# Patient Record
Sex: MALE | Race: WHITE | NOT HISPANIC OR LATINO | Employment: FULL TIME | URBAN - METROPOLITAN AREA
[De-identification: names, ages, dates, MRNs, and addresses within clinical notes are randomized per-mention and may not be internally consistent; named-entity substitution may affect disease eponyms.]

---

## 2017-02-14 ENCOUNTER — TELEPHONE (OUTPATIENT)
Dept: FAMILY MEDICINE | Facility: OTHER | Age: 37
End: 2017-02-14

## 2017-02-14 DIAGNOSIS — R06.02 SOB (SHORTNESS OF BREATH): Primary | ICD-10-CM

## 2017-02-14 NOTE — TELEPHONE ENCOUNTER
CALL BACK and he is still having difficulty breathing mostly on exertion so would like to follow up with a specialist    Please put in referral

## 2017-02-14 NOTE — TELEPHONE ENCOUNTER
The report was never finalized, which is why I didn't receive it in my inbox for review (may want to let Radiology know this).  CT is negative, no abnormal findings present.  I apologize for the delay.

## 2017-02-21 ENCOUNTER — OFFICE VISIT (OUTPATIENT)
Dept: SLEEP MEDICINE | Facility: HOSPITAL | Age: 37
End: 2017-02-21
Attending: FAMILY MEDICINE

## 2017-02-21 VITALS
HEIGHT: 67 IN | SYSTOLIC BLOOD PRESSURE: 128 MMHG | HEART RATE: 58 BPM | BODY MASS INDEX: 39.55 KG/M2 | DIASTOLIC BLOOD PRESSURE: 88 MMHG | WEIGHT: 252 LBS | OXYGEN SATURATION: 96 %

## 2017-02-21 DIAGNOSIS — R06.02 SOB (SHORTNESS OF BREATH): ICD-10-CM

## 2017-02-21 PROCEDURE — 99212 OFFICE O/P EST SF 10 MIN: CPT

## 2017-02-21 PROCEDURE — 99212 OFFICE O/P EST SF 10 MIN: CPT | Performed by: INTERNAL MEDICINE

## 2017-02-21 PROCEDURE — 99213 OFFICE O/P EST LOW 20 MIN: CPT

## 2017-02-21 NOTE — NURSING NOTE
Patient ID was checked. Allergies and home medications were reviewed and a brief history was obtained.

## 2017-02-21 NOTE — MR AVS SNAPSHOT
After Visit Summary   2/21/2017    Kale Rosario    MRN: [id number]           Patient Information     Date Of Birth          1980        Visit Information        Provider Department      2/21/2017 9:00 AM Sylvain Klein MD [facility]        Today's Diagnoses     SOB (shortness of breath)           Follow-ups after your visit        Your next 10 appointments already scheduled     Mar 02, 2017  4:15 PM CST   Pulmonary Function with [facility]   [facility] ([facility] )    [address] [place] [id number] [phone]           No Inhalers for 6 hours prior to test No Smoking 2 hours prior to test            May 08, 2017  9:40 AM CDT   (Arrive by 9:25 AM)   CT LUMBAR SPINE W/O CONTRAST with UCCT1   [facility] [place] [facility] ([facility])    [address] [place] [address] [phone]           Please bring any scans or X-rays taken at other hospitals, if similar tests were done. Also bring a list of your medicines, including vitamins, minerals and over-the-counter drugs. It is safest to leave personal items at home.  Be sure to tell your doctor:   If you have any allergies.   If there s any chance you are pregnant.   If you are breastfeeding.   If you have any special needs.  You do not need to do anything special to prepare.  Please wear loose clothing, such as a sweat suit or jogging clothes. Avoid snaps, zippers and other metal. We may ask you to undress and put on a hospital gown.            May 15, 2017 11:15 AM CDT   (Arrive by 11:00 AM)   Return Visit with Devon Summers MD   [facility] Neurosurgery ([facility])    [address] [place] [address] [phone]              Who to contact     If you have questions or need follow up information about today's clinic visit or your schedule please contact [facility] directly at  "[phone].  Normal or non-critical lab and imaging results will be communicated to you by Reevoohart, letter or phone within 4 business days after the clinic has received the results. If you do not hear from us within 7 days, please contact the clinic through Reevoohart or phone. If you have a critical or abnormal lab result, we will notify you by phone as soon as possible.  Submit refill requests through [employer] or call your pharmacy and they will forward the refill request to us. Please allow 3 business days for your refill to be completed.          Additional Information About Your Visit        Reevoohart Information     Aseptia lets you send messages to your doctor, view your test results, renew your prescriptions, schedule appointments and more. To sign up, go to [email or web address] . Click on \"Log in\" on the left side of the screen, which will take you to the Welcome page. Then click on \"Sign up Now\" on the right side of the page.     You will be asked to enter the access code listed below, as well as some personal information. Please follow the directions to create your username and password.     Your access code is: [email or web address]  Expires: 2017  9:47 AM     Your access code will  in 90 days. If you need help or a new code, please call your [place] clinic or [phone].        Care EveryWhere ID     This is your Care EveryWhere ID. This could be used by other organizations to access your [place] medical records  [id number]        Your Vitals Were     Pulse Height Pulse Oximetry BMI (Body Mass Index)          58 5' 7\" (1.702 m) 96% 39.47 kg/m2         Blood Pressure from Last 3 Encounters:   17 128/88   16 120/73   16 122/76    Weight from Last 3 Encounters:   17 252 lb (114.3 kg)   16 250 lb 3.2 oz (113.5 kg)   16 260 lb (117.9 kg)              Today, you had the following     No orders found for display         Today's Medication Changes          These " changes are accurate as of: 2/21/17  9:47 AM.  If you have any questions, ask your nurse or doctor.               These medicines have changed or have updated prescriptions.        Dose/Directions    citalopram 20 MG tablet   Commonly known as:  celeXA   This may have changed:    - when to take this  - reasons to take this   Used for:  Episodic mood disorder (H)        Dose:  20 mg   Take 1 tablet (20 mg) by mouth daily   Quantity:  90 tablet   Refills:  0                Primary Care Provider Office Phone # Fax #    Gabriela Morales [phone]       [address] [address]        Thank you!     Thank you for choosing [facility]  for your care. Our goal is always to provide you with excellent care. Hearing back from our patients is one way we can continue to improve our services. Please take a few minutes to complete the written survey that you may receive in the mail after your visit with us. Thank you!             Your Updated Medication List - Protect others around you: Learn how to safely use, store and throw away your medicines at www.disposemymeds.org.          This list is accurate as of: 2/21/17  9:47 AM.  Always use your most recent med list.                   Brand Name Dispense Instructions for use    albuterol 108 (90 BASE) MCG/ACT Inhaler    PROAIR HFA/PROVENTIL HFA/VENTOLIN HFA    1 Inhaler    Inhale 2 puffs into the lungs every 6 hours as needed for shortness of breath / dyspnea       citalopram 20 MG tablet    celeXA    90 tablet    Take 1 tablet (20 mg) by mouth daily       cyclobenzaprine 10 MG tablet    FLEXERIL    120 tablet    Take 1 tablet (10 mg) by mouth 3 times daily as needed for muscle spasms       EPINEPHrine 0.3 MG/0.3ML injection    EPIPEN 2-MIKE    1 each    Inject 0.3 mLs (0.3 mg) into the muscle once as needed for anaphylaxis       HYDROcodone-acetaminophen  MG per tablet    NORCO    120 tablet    Take 1-2  tablets by mouth every 4 hours as needed for moderate to severe pain

## 2017-02-21 NOTE — PROGRESS NOTES
"37 y/o I've seen before, hx of DEVON, mild asthma. Had been told in the past thru work he had 'placqueing' on his CXRs that is getting worse. I have reviewed these 'B reader' films and they look normal to me. A recent CT chest showed no placques and not obvious interstitial disease. 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 Non smoker, no cough, no hx of heart dx, normal Echo a few years ago. Works out at the gym incl cardo/elliptical, doesn't have much dyspnea then but gets dyspneic with stairs, feels lightheaded. No sig airways hypersensitivity. Weight has been stable.    [place] [employer] [place]    /88 (BP Location: Right arm, Cuff Size: Adult Large)  Pulse 58  Ht 5' 7\" (1.702 m)  Wt 252 lb (114.3 kg)  SpO2 96%  BMI 39.47 kg/m2  resp clear  Cor rrr    A/ No sign on CT of placques or other asbestos/dust disease, will check complete PFTs.  "

## 2017-03-15 ENCOUNTER — HOSPITAL ENCOUNTER (OUTPATIENT)
Dept: RESPIRATORY THERAPY | Facility: HOSPITAL | Age: 37
Discharge: HOME | End: 2017-03-15
Attending: INTERNAL MEDICINE | Admitting: INTERNAL MEDICINE

## 2017-03-15 DIAGNOSIS — R06.02 SOB (SHORTNESS OF BREATH): ICD-10-CM

## 2017-03-15 LAB
COHGB MFR BLD: 1.1 % (ref 0–2)
HGB BLD-MCNC: 12.8 G/DL (ref 13.3–17.7)

## 2017-03-15 PROCEDURE — 94010 BREATHING CAPACITY TEST: CPT | Mod: 26 | Performed by: INTERNAL MEDICINE

## 2017-03-15 PROCEDURE — 85018 HEMOGLOBIN: CPT | Performed by: INTERNAL MEDICINE

## 2017-03-15 PROCEDURE — 94726 PLETHYSMOGRAPHY LUNG VOLUMES: CPT | Mod: 26 | Performed by: INTERNAL MEDICINE

## 2017-03-15 PROCEDURE — 94726 PLETHYSMOGRAPHY LUNG VOLUMES: CPT

## 2017-03-15 PROCEDURE — 94729 DIFFUSING CAPACITY: CPT | Mod: 26 | Performed by: INTERNAL MEDICINE

## 2017-03-15 PROCEDURE — 94729 DIFFUSING CAPACITY: CPT

## 2017-03-15 PROCEDURE — 36415 COLL VENOUS BLD VENIPUNCTURE: CPT | Performed by: INTERNAL MEDICINE

## 2017-03-15 PROCEDURE — 82375 ASSAY CARBOXYHB QUANT: CPT | Performed by: INTERNAL MEDICINE

## 2017-03-15 PROCEDURE — 94010 BREATHING CAPACITY TEST: CPT

## 2017-03-15 RX ORDER — ALBUTEROL SULFATE 0.83 MG/ML
2.5 SOLUTION RESPIRATORY (INHALATION)
Status: DISCONTINUED | OUTPATIENT
Start: 2017-03-15 | End: 2017-03-16 | Stop reason: HOSPADM

## 2017-05-15 ENCOUNTER — OFFICE VISIT (OUTPATIENT)
Dept: NEUROSURGERY | Facility: CLINIC | Age: 37
End: 2017-05-15

## 2017-05-15 VITALS
BODY MASS INDEX: 39.93 KG/M2 | WEIGHT: 254.4 LBS | DIASTOLIC BLOOD PRESSURE: 82 MMHG | HEART RATE: 55 BPM | HEIGHT: 67 IN | SYSTOLIC BLOOD PRESSURE: 121 MMHG

## 2017-05-15 DIAGNOSIS — M43.17 ACQUIRED SPONDYLOLISTHESIS OF LUMBOSACRAL REGION: Primary | ICD-10-CM

## 2017-05-15 ASSESSMENT — PAIN SCALES - GENERAL: PAINLEVEL: NO PAIN (0)

## 2017-05-15 NOTE — NURSING NOTE
Chief Complaint   Patient presents with     RECHECK     CT done 5/8, 9 month follow up right sided l3-4decompression, L3-4transforaminal lumbar interbody fusion

## 2017-05-15 NOTE — LETTER
"5/15/2017       RE: Kale Rosario  [address]   [place] [address]     Dear Colleague,    Thank you for referring your patient, Kale Rosario, to the [facility] NEUROSURGERY at [facility]. Please see a copy of my visit note below.    It was a pleasure to see Kale Rosario today in Neurosurgery Clinic. he is a 36 year old male who underwent:    DATE OF OPERATION: 06/01/2016.       ATTENDING PHYSICIAN: Devon Summers MD       : Sherman Man MD       PREOPERATIVE DIAGNOSIS: Right-sided L3 radiculopathy with spondylolisthesis of L3 into L4.       PROCEDURES PERFORMED:   1. Right-sided L3-4 decompression.   2. Right-sided L3-4 transforaminal lumbar interbody fusion.   3. L3-L4 posterior segmental instrumentation.   4. Intraoperative use of O-arm and intraoperative use of neuronavigation.     Doing well.    Vitals:    05/15/17 1043   BP: 121/82   Pulse: 55   Weight: 115.4 kg (254 lb 6.4 oz)   Height: 1.702 m (5' 7\")     Body mass index is 39.84 kg/(m^2).  No Pain (0)    CT shows solid fusion across L3-4.    A: s/p lumbar fusion.    P: RTC PRN.    Again, thank you for allowing me to participate in the care of your patient.      Sincerely,  Devon Summers MD      "

## 2017-05-15 NOTE — MR AVS SNAPSHOT
"              After Visit Summary   5/15/2017    Kale Rosario    MRN: [id number]           Patient Information     Date Of Birth          1980        Visit Information        Provider Department      5/15/2017 11:30 AM Devon Summers MD [facility]        Today's Diagnoses     Acquired spondylolisthesis of lumbosacral region    -  1       Follow-ups after your visit        Who to contact     Please call your clinic at [phone] to:    Ask questions about your health    Make or cancel appointments    Discuss your medicines    Learn about your test results    Speak to your doctor   If you have compliments or concerns about an experience at your clinic, or if you wish to file a complaint, please contact [facility] Patient Relations at [phone] or email us at [email or web address]         Additional Information About Your Visit        MyChart Information     Antidott is an electronic gateway that provides easy, online access to your medical records. With [employer], you can request a clinic appointment, read your test results, renew a prescription or communicate with your care team.     To sign up for Antidott visit the website at [email or web address]   You will be asked to enter the access code listed below, as well as some personal information. Please follow the directions to create your username and password.     Your access code is: [email or web address]  Expires: 2017 10:47 AM     Your access code will  in 90 days. If you need help or a new code, please contact your [facility] or call [phone] for assistance.        Care EveryWhere ID     This is your Care EveryWhere ID. This could be used by other organizations to access your [place] medical records  [id number]        Your Vitals Were     Pulse Height BMI (Body Mass Index)             55 1.702 m (5' 7\") 39.84 kg/m2          Blood Pressure from Last 3 " Encounters:   05/15/17 121/82   02/21/17 128/88   08/29/16 120/73    Weight from Last 3 Encounters:   05/15/17 115.4 kg (254 lb 6.4 oz)   02/21/17 114.3 kg (252 lb)   08/29/16 113.5 kg (250 lb 3.2 oz)              Today, you had the following     No orders found for display       Primary Care Provider Office Phone # Fax #    Gabriela Morales [phone]       [address] [address]        Thank you!     Thank you for choosing [facility]  for your care. Our goal is always to provide you with excellent care. Hearing back from our patients is one way we can continue to improve our services. Please take a few minutes to complete the written survey that you may receive in the mail after your visit with us. Thank you!             Your Updated Medication List - Protect others around you: Learn how to safely use, store and throw away your medicines at www.disposemymeds.org.          This list is accurate as of: 5/15/17 11:59 PM.  Always use your most recent med list.                   Brand Name Dispense Instructions for use    albuterol 108 (90 BASE) MCG/ACT Inhaler    PROAIR HFA/PROVENTIL HFA/VENTOLIN HFA    1 Inhaler    Inhale 2 puffs into the lungs every 6 hours as needed for shortness of breath / dyspnea       citalopram 20 MG tablet    celeXA    90 tablet    Take 1 tablet (20 mg) by mouth daily       cyclobenzaprine 10 MG tablet    FLEXERIL    120 tablet    Take 1 tablet (10 mg) by mouth 3 times daily as needed for muscle spasms       EPINEPHrine 0.3 MG/0.3ML injection    EPIPEN 2-MIKE    1 each    Inject 0.3 mLs (0.3 mg) into the muscle once as needed for anaphylaxis       HYDROcodone-acetaminophen  MG per tablet    NORCO    120 tablet    Take 1-2 tablets by mouth every 4 hours as needed for moderate to severe pain

## 2017-05-15 NOTE — PROGRESS NOTES
"It was a pleasure to see Kale Rosario today in Neurosurgery Clinic. he is a 36 year old male who underwent:    DATE OF OPERATION: 06/01/2016.       ATTENDING PHYSICIAN: Devon Summers MD       : Sherman Man MD       PREOPERATIVE DIAGNOSIS: Right-sided L3 radiculopathy with spondylolisthesis of L3 into L4.       PROCEDURES PERFORMED:   1. Right-sided L3-4 decompression.   2. Right-sided L3-4 transforaminal lumbar interbody fusion.   3. L3-L4 posterior segmental instrumentation.   4. Intraoperative use of O-arm and intraoperative use of neuronavigation.     Doing well.    Vitals:    05/15/17 1043   BP: 121/82   Pulse: 55   Weight: 115.4 kg (254 lb 6.4 oz)   Height: 1.702 m (5' 7\")     Body mass index is 39.84 kg/(m^2).  No Pain (0)    CT shows solid fusion across L3-4.    A: s/p lumbar fusion.    P: RTC PRN.  "

## 2017-11-06 ENCOUNTER — TELEPHONE (OUTPATIENT)
Dept: FAMILY MEDICINE | Facility: OTHER | Age: 37
End: 2017-11-06

## 2018-02-02 ENCOUNTER — OFFICE VISIT (OUTPATIENT)
Dept: FAMILY MEDICINE | Facility: OTHER | Age: 38
End: 2018-02-02
Attending: FAMILY MEDICINE
Payer: COMMERCIAL

## 2018-02-02 VITALS
TEMPERATURE: 97.8 F | RESPIRATION RATE: 18 BRPM | DIASTOLIC BLOOD PRESSURE: 82 MMHG | SYSTOLIC BLOOD PRESSURE: 118 MMHG | BODY MASS INDEX: 39.87 KG/M2 | HEIGHT: 67 IN | OXYGEN SATURATION: 95 % | HEART RATE: 82 BPM | WEIGHT: 254 LBS

## 2018-02-02 DIAGNOSIS — Z20.2 POSSIBLE EXPOSURE TO STD: ICD-10-CM

## 2018-02-02 DIAGNOSIS — Z30.09 VASECTOMY EVALUATION: Primary | ICD-10-CM

## 2018-02-02 PROBLEM — E66.01 MORBID OBESITY (H): Status: ACTIVE | Noted: 2018-02-02

## 2018-02-02 PROCEDURE — 87389 HIV-1 AG W/HIV-1&-2 AB AG IA: CPT | Mod: 90 | Performed by: FAMILY MEDICINE

## 2018-02-02 PROCEDURE — 86780 TREPONEMA PALLIDUM: CPT | Mod: 90 | Performed by: FAMILY MEDICINE

## 2018-02-02 PROCEDURE — 99000 SPECIMEN HANDLING OFFICE-LAB: CPT | Performed by: FAMILY MEDICINE

## 2018-02-02 PROCEDURE — 87491 CHLMYD TRACH DNA AMP PROBE: CPT | Performed by: FAMILY MEDICINE

## 2018-02-02 PROCEDURE — 86803 HEPATITIS C AB TEST: CPT | Mod: 90 | Performed by: FAMILY MEDICINE

## 2018-02-02 PROCEDURE — 87591 N.GONORRHOEAE DNA AMP PROB: CPT | Performed by: FAMILY MEDICINE

## 2018-02-02 PROCEDURE — 36415 COLL VENOUS BLD VENIPUNCTURE: CPT | Performed by: FAMILY MEDICINE

## 2018-02-02 PROCEDURE — 99213 OFFICE O/P EST LOW 20 MIN: CPT | Performed by: FAMILY MEDICINE

## 2018-02-02 RX ORDER — NAPROXEN 500 MG/1
500 TABLET ORAL 4 TIMES DAILY PRN
COMMUNITY
Start: 2017-11-13

## 2018-02-02 RX ORDER — OMEGA-3 FATTY ACIDS/FISH OIL 300-1000MG
CAPSULE ORAL
COMMUNITY
Start: 2017-10-10

## 2018-02-02 ASSESSMENT — ANXIETY QUESTIONNAIRES
7. FEELING AFRAID AS IF SOMETHING AWFUL MIGHT HAPPEN: NOT AT ALL
GAD7 TOTAL SCORE: 3
6. BECOMING EASILY ANNOYED OR IRRITABLE: SEVERAL DAYS
3. WORRYING TOO MUCH ABOUT DIFFERENT THINGS: SEVERAL DAYS
2. NOT BEING ABLE TO STOP OR CONTROL WORRYING: NOT AT ALL
1. FEELING NERVOUS, ANXIOUS, OR ON EDGE: NOT AT ALL
5. BEING SO RESTLESS THAT IT IS HARD TO SIT STILL: NOT AT ALL
4. TROUBLE RELAXING: SEVERAL DAYS

## 2018-02-02 ASSESSMENT — PAIN SCALES - GENERAL: PAINLEVEL: NO PAIN (0)

## 2018-02-02 NOTE — MR AVS SNAPSHOT
After Visit Summary   2/2/2018    Kale Rosario    MRN: [id number]           Patient Information     Date Of Birth          1980        Visit Information        Provider Department      2/2/2018 11:15 AM Gabriela Morales MD [facility]        Today's Diagnoses     Vasectomy evaluation    -  1    Possible exposure to STD           Follow-ups after your visit        Additional Services     UROLOGY ADULT REFERRAL       Your provider has referred you to: [facility] Urology    Please be aware that coverage of these services is subject to the terms and limitations of your health insurance plan.  Call member services at your health plan with any benefit or coverage questions.      Please bring the following with you to your appointment:    (1) Any X-Rays, CTs or MRIs which have been performed.  Contact the facility where they were done to arrange for  prior to your scheduled appointment.    (2) List of current medications  (3) This referral request   (4) Any documents/labs given to you for this referral                  Follow-up notes from your care team     Return if symptoms worsen or fail to improve.      Who to contact     If you have questions or need follow up information about today's clinic visit or your schedule please contact [facility] directly at [phone].  Normal or non-critical lab and imaging results will be communicated to you by MyChart, letter or phone within 4 business days after the clinic has received the results. If you do not hear from us within 7 days, please contact the clinic through MyChart or phone. If you have a critical or abnormal lab result, we will notify you by phone as soon as possible.  Submit refill requests through [employer] or call your pharmacy and they will forward the refill request to us. Please allow 3 business days for your refill to be completed.          Additional Information About Your Visit        MyChart  "Information     [facility] lets you send messages to your doctor, view your test results, renew your prescriptions, schedule appointments and more. To sign up, go to [email or web address] . Click on \"Log in\" on the left side of the screen, which will take you to the Welcome page. Then click on \"Sign up Now\" on the right side of the page.     You will be asked to enter the access code listed below, as well as some personal information. Please follow the directions to create your username and password.     Your access code is: [email or web address]  Expires: 5/3/2018 12:37 PM     Your access code will  in 90 days. If you need help or a new code, please call your [place] clinic or [phone].        Care EveryWhere ID     This is your Care EveryWhere ID. This could be used by other organizations to access your [place] medical records  [id number]        Your Vitals Were     Pulse Temperature Respirations Height Pulse Oximetry BMI (Body Mass Index)    82 97.8  F (36.6  C) (Tympanic) 18 5' 7\" (1.702 m) 95% 39.78 kg/m2       Blood Pressure from Last 3 Encounters:   18 118/82   05/15/17 121/82   17 128/88    Weight from Last 3 Encounters:   18 254 lb (115.2 kg)   05/15/17 254 lb 6.4 oz (115.4 kg)   17 252 lb (114.3 kg)              We Performed the Following     Anti Treponema     GC/Chlamydia by PCR - [facility]     Hepatitis C antibody     HIV Antigen Antibody Combo     UROLOGY ADULT REFERRAL        Primary Care Provider Office Phone # Fax #    Gabriela Morales [phone] [address] [facility] [address]        Equal Access to Services     JORGE ALBERTO PITTMAN : Shaina Dangelo, wapamela velásquez, qaybta janina bryant. [facility] [phone].    ATENCIÓN: Si habla español, tiene a young disposición servicios gratuitos de asistencia lingüística. Llame al [phone].    We comply with applicable federal civil " rights laws and [place] laws. We do not discriminate on the basis of race, color, national origin, age, disability, sex, sexual orientation, or gender identity.            Thank you!     Thank you for choosing [facility]  for your care. Our goal is always to provide you with excellent care. Hearing back from our patients is one way we can continue to improve our services. Please take a few minutes to complete the written survey that you may receive in the mail after your visit with us. Thank you!             Your Updated Medication List - Protect others around you: Learn how to safely use, store and throw away your medicines at www.disposemymeds.org.          This list is accurate as of 2/2/18 12:37 PM.  Always use your most recent med list.                   Brand Name Dispense Instructions for use Diagnosis    albuterol 108 (90 BASE) MCG/ACT Inhaler    PROAIR HFA/PROVENTIL HFA/VENTOLIN HFA    1 Inhaler    Inhale 2 puffs into the lungs every 6 hours as needed for shortness of breath / dyspnea    SOB (shortness of breath)       cyclobenzaprine 10 MG tablet    FLEXERIL    120 tablet    Take 1 tablet (10 mg) by mouth 3 times daily as needed for muscle spasms    Muscle spasm       EPINEPHrine 0.3 MG/0.3ML injection 2-pack    EPIPEN 2-MIKE    1 each    Inject 0.3 mLs (0.3 mg) into the muscle once as needed for anaphylaxis    Bee sting reaction       HYDROcodone-acetaminophen  MG per tablet    NORCO    120 tablet    Take 1-2 tablets by mouth every 4 hours as needed for moderate to severe pain    Acquired spondylolisthesis of lumbosacral region       ibuprofen 200 MG capsule      Take with food or milk.    2 capsules as needed.        naproxen 500 MG tablet    NAPROSYN     Take 500 mg by mouth

## 2018-02-02 NOTE — PROGRESS NOTES
SUBJECTIVE:   Kale Rosario is a 37 year old male who presents to clinic today for the following health issues:      Patient presents today with two concerns.  First, he would like a referral to discuss a vasectomy.  Second, he would like STD screening.        Problem list and histories reviewed & adjusted, as indicated.  Additional history: as documented    Patient Active Problem List   Diagnosis     Asthma     History of acute pancreatitis     Episodic mood disorder (H)     Hyperlipidemia with target LDL less than 100     Acquired spondylolisthesis of lumbosacral region     Lumbosacral radiculopathy at L3     Lumbar radicular pain     Morbid obesity (H)     Past Surgical History:   Procedure Laterality Date     APPENDECTOMY  1995     LASIK 2010    Bilateral     OPTICAL TRACKING SYSTEM FUSION POSTERIOR SPINE LUMBAR Right 6/1/2016    Procedure: OPTICAL TRACKING SYSTEM FUSION SPINE POSTERIOR LUMBAR ONE LEVEL;  Surgeon: Devon Summers MD;  Location: [facility] OR     ORTHOPEDIC SURGERY  6/1/16    L 3-L4 fusion        Social History   Substance Use Topics     Smoking status: Never Smoker     Smokeless tobacco: Never Used     Alcohol use Yes      Comment: OCCASIONAL     Family History   Problem Relation Age of Onset     Other - See Comments Maternal Grandmother 89     blood clot     Other - See Comments Father      aortiv anyerism     C.A.D. Father      HEART DISEASE Father      Lipids Father          Current Outpatient Prescriptions   Medication Sig Dispense Refill     ibuprofen 200 MG capsule Take with food or milk.    2 capsules as needed.       naproxen (NAPROSYN) 500 MG tablet Take 500 mg by mouth       HYDROcodone-acetaminophen (NORCO)  MG per tablet Take 1-2 tablets by mouth every 4 hours as needed for moderate to severe pain 120 tablet 0     cyclobenzaprine (FLEXERIL) 10 MG tablet Take 1 tablet (10 mg) by mouth 3 times daily as needed for muscle spasms 120 tablet 0     EPINEPHrine (EPIPEN 2-MIKE) 0.3  "MG/0.3ML injection Inject 0.3 mLs (0.3 mg) into the muscle once as needed for anaphylaxis 1 each 2     albuterol (PROAIR HFA, PROVENTIL HFA, VENTOLIN HFA) 108 (90 BASE) MCG/ACT inhaler Inhale 2 puffs into the lungs every 6 hours as needed for shortness of breath / dyspnea 1 Inhaler 1     Allergies   Allergen Reactions     Penicillins      Bees Swelling       Reviewed and updated as needed this visit by clinical staff  Tobacco  Allergies  Meds  Problems       Reviewed and updated as needed this visit by Provider         ROS:  Constitutional, HEENT, cardiovascular, pulmonary, gi and gu systems are negative, except as otherwise noted.    OBJECTIVE:     /82  Pulse 82  Temp 97.8  F (36.6  C) (Tympanic)  Resp 18  Ht 5' 7\" (1.702 m)  Wt 254 lb (115.2 kg)  SpO2 95%  BMI 39.78 kg/m2  Body mass index is 39.78 kg/(m^2).  GENERAL: healthy, alert and no distress  PSYCH: mentation appears normal, affect normal/bright    Diagnostic Test Results:  none     ASSESSMENT/PLAN:     1. Vasectomy evaluation  Referral made to Urology.  Follow-up as needed.  - UROLOGY ADULT REFERRAL    2. Possible exposure to STD  Screening ordered.  Follow-up with results when available.  - Anti Treponema  - Hepatitis C antibody  - HIV Antigen Antibody Combo  - GC/Chlamydia by PCR - [facility],        Gabriela Morales MD  [facility]  "

## 2018-02-02 NOTE — NURSING NOTE
"Chief Complaint   Patient presents with     Consult       Initial /82  Pulse 82  Temp 97.8  F (36.6  C) (Tympanic)  Resp 18  Ht 5' 7\" (1.702 m)  Wt 254 lb (115.2 kg)  SpO2 95%  BMI 39.78 kg/m2 Estimated body mass index is 39.78 kg/(m^2) as calculated from the following:    Height as of this encounter: 5' 7\" (1.702 m).    Weight as of this encounter: 254 lb (115.2 kg).  Medication Reconciliation: complete     Marilyn Hernandez LPN  "

## 2018-02-03 LAB
C TRACH DNA SPEC QL PROBE+SIG AMP: NOT DETECTED
N GONORRHOEA DNA SPEC QL PROBE+SIG AMP: NOT DETECTED
SPECIMEN SOURCE: NORMAL
T PALLIDUM IGG+IGM SER QL: NEGATIVE

## 2018-02-03 ASSESSMENT — PATIENT HEALTH QUESTIONNAIRE - PHQ9: SUM OF ALL RESPONSES TO PHQ QUESTIONS 1-9: 10

## 2018-02-03 ASSESSMENT — ANXIETY QUESTIONNAIRES: GAD7 TOTAL SCORE: 3

## 2018-02-05 LAB
HCV AB SERPL QL IA: NONREACTIVE
HIV 1+2 AB+HIV1 P24 AG SERPL QL IA: NONREACTIVE

## 2018-02-28 ENCOUNTER — OFFICE VISIT (OUTPATIENT)
Dept: UROLOGY | Facility: OTHER | Age: 38
End: 2018-02-28
Attending: FAMILY MEDICINE
Payer: COMMERCIAL

## 2018-02-28 VITALS
HEART RATE: 68 BPM | DIASTOLIC BLOOD PRESSURE: 82 MMHG | BODY MASS INDEX: 39.87 KG/M2 | OXYGEN SATURATION: 96 % | WEIGHT: 254 LBS | HEIGHT: 67 IN | SYSTOLIC BLOOD PRESSURE: 120 MMHG | TEMPERATURE: 96.6 F | RESPIRATION RATE: 20 BRPM

## 2018-02-28 DIAGNOSIS — Z30.09 VASECTOMY EVALUATION: ICD-10-CM

## 2018-02-28 PROCEDURE — 99203 OFFICE O/P NEW LOW 30 MIN: CPT | Performed by: UROLOGY

## 2018-02-28 RX ORDER — HYDROCODONE BITARTRATE AND ACETAMINOPHEN 5; 325 MG/1; MG/1
1-2 TABLET ORAL EVERY 4 HOURS PRN
Qty: 12 TABLET | Refills: 0 | Status: SHIPPED | OUTPATIENT
Start: 2018-02-28

## 2018-02-28 ASSESSMENT — PAIN SCALES - GENERAL: PAINLEVEL: NO PAIN (0)

## 2018-02-28 NOTE — NURSING NOTE
"Chief Complaint   Patient presents with     Consult     Vasectomy consult per Dr Morales.       Initial /82  Pulse 68  Temp 96.6  F (35.9  C) (Tympanic)  Resp 20  Ht 1.702 m (5' 7\")  Wt 115.2 kg (254 lb)  SpO2 96%  BMI 39.78 kg/m2 Estimated body mass index is 39.78 kg/(m^2) as calculated from the following:    Height as of this encounter: 1.702 m (5' 7\").    Weight as of this encounter: 115.2 kg (254 lb).  Medication Reconciliation: complete  Review of Systems:    Weight loss:    No     Recent fever/chills:  No   Night sweats:   No  Current skin rash:  No   Recent hair loss:  No  Heat intolerance:  No   Cold intolerance:  No  Chest pain:   No   Palpitations:   No  Shortness of breath:  No   Wheezing:   No  Constipation:    No   Diarrhea:   No   Nausea:   No   Vomiting:   No   Kidney/side pain:  No   Back pain:   No  Frequent headaches:  No   Dizziness:     No  Leg swelling:   No   Calf pain:    No    Parents, brothers or sisters with history of kidney cancer?   No  Parents, brothers or sisters with history of bladder cancer: No    "

## 2018-02-28 NOTE — PROGRESS NOTES
I was asked to see this patient by Dr Morales and provide my opinion about the following:  Elective sterilization    Type of Visit  Consult    Chief Complaint  Elective sterilization    HPI  Mr. Rosario is a 37 year old male who presents with desire for irreversible, elective sterilization.    He has 2 kids - 1 adopted.  He is currently .  He denies erectile dysfunction.  He denies a history of bleeding disorders or reactions to local anesthetic.      Past Medical History  He  has a past medical history of Asthma,unspecified type, unspecified (02/21/2011); History of acute pancreatitis; Hyperlipidemia LDL goal < 100 (4/1/2014); and Unspecified episodic mood disorder (4/1/2014).  Patient Active Problem List   Diagnosis     Asthma     History of acute pancreatitis     Episodic mood disorder (H)     Hyperlipidemia with target LDL less than 100     Acquired spondylolisthesis of lumbosacral region     Lumbosacral radiculopathy at L3     Lumbar radicular pain     Morbid obesity (H)       Past Surgical History  He  has a past surgical history that includes lasik (2010); appendectomy (1995); Optical tracking system fusion spine posterior lumbar one level (Right, 6/1/2016); and orthopedic surgery (6/1/16).    Medications  He has a current medication list which includes the following prescription(s): hydrocodone-acetaminophen, ibuprofen, naproxen, hydrocodone-acetaminophen, cyclobenzaprine, epinephrine, and albuterol.    Allergies  Allergies   Allergen Reactions     Penicillins      Bees Swelling       Social History  He  reports that he has never smoked. He has never used smokeless tobacco. He reports that he drinks alcohol. He reports that he does not use illicit drugs.  No drug abuse.    Family History  Family History   Problem Relation Age of Onset     Other - See Comments Father      aortiv anyerism     C.A.D. Father      HEART DISEASE Father      Lipids Father      Other - See Comments Maternal Grandmother 89      "blood clot       Review of Systems  I personally reviewed the ROS with the patient.    Nursing Notes:   Maite Oden, LPN  2/28/2018  9:55 AM  Unsigned  Chief Complaint   Patient presents with     Consult     Vasectomy consult per Dr Moarles.       Initial /82  Pulse 68  Temp 96.6  F (35.9  C) (Tympanic)  Resp 20  Ht 1.702 m (5' 7\")  Wt 115.2 kg (254 lb)  SpO2 96%  BMI 39.78 kg/m2 Estimated body mass index is 39.78 kg/(m^2) as calculated from the following:    Height as of this encounter: 1.702 m (5' 7\").    Weight as of this encounter: 115.2 kg (254 lb).  Medication Reconciliation: complete  Review of Systems:    Weight loss:    No     Recent fever/chills:  No   Night sweats:   No  Current skin rash:  No   Recent hair loss:  No  Heat intolerance:  No   Cold intolerance:  No  Chest pain:   No   Palpitations:   No  Shortness of breath:  No   Wheezing:   No  Constipation:    No   Diarrhea:   No   Nausea:   No   Vomiting:   No   Kidney/side pain:  No   Back pain:   No  Frequent headaches:  No   Dizziness:     No  Leg swelling:   No   Calf pain:    No    Parents, brothers or sisters with history of kidney cancer?   No  Parents, brothers or sisters with history of bladder cancer: No      Physical Exam  Vitals:    02/28/18 0950   BP: 120/82   Pulse: 68   Resp: 20   Temp: 96.6  F (35.9  C)   TempSrc: Tympanic   SpO2: 96%   Weight: 115.2 kg (254 lb)   Height: 1.702 m (5' 7\")     Constitutional: NAD, WDWN.   Head: NCAT  Eyes: Conjunctivae normal  Cardiovascular: Regular rate and rhythm  Pulmonary/Chest: Respirations are even and non-labored bilaterally. CTAB  Abdominal: Soft. No distension, tenderness, masses or guarding. No CVA tenderness.  Neurological: A + O x 3.  Cranial Nerves II-XII grossly intact.  Extremities: MARCIA x 4, Warm. No clubbing.  No cyanosis.    Skin: Pink, warm and dry.  No rashes noted.  Psychiatric:  Normal mood and affect  Genitourinary:  Phallus normal without lesions, testicles " descended bilaterally, no masses.    Bilateral vasa palpable    Assessment  Mr. Rosario is a 37 year old male who presents today requesting permanent, irreversible surgical sterilization.  The vasectomy procedure was discussed in detail with the patient today including the following:  - Preparation prior to the procedure  - Expectations the day of the procedure  - Risks of the procedure such as sterilization failure, infection, bleeding and/or development of chronic testicular pain.    - Expectations and limitations during recovery    Furthermore, the patient was told he would remain fertile following the procedure until he provided a semen analysis that met the definition of infertile (no sperm present or <100,000 nonmotile sperm/mL).  This is usually planned for 3 months after the procedure.  The patient expressed understanding and desire to proceed.    Plan  Rx for Norco prior to and after the procedure in the OR under [facility] per patient request  Provided vasectomy hand out again outlining the above risks and benefits as well as need for follow up semen analysis

## 2018-02-28 NOTE — MR AVS SNAPSHOT
"              After Visit Summary   2/28/2018    Kale Rosario    MRN: [id number]           Patient Information     Date Of Birth          1980        Visit Information        Provider Department      2/28/2018 10:00 AM Kale Story MD [facility]        Today's Diagnoses     Vasectomy evaluation           Follow-ups after your visit        Your next 10 appointments already scheduled     Mar 13, 2018  2:30 PM CDT   (Arrive by 2:15 PM)   Pre-Op physical with Gabriela Morales MD   [facility] )    [address]  [facility] [address]   [phone]              Who to contact     If you have questions or need follow up information about today's clinic visit or your schedule please contact [facility] directly at [phone].  Normal or non-critical lab and imaging results will be communicated to you by MyChart, letter or phone within 4 business days after the clinic has received the results. If you do not hear from us within 7 days, please contact the clinic through MyChart or phone. If you have a critical or abnormal lab result, we will notify you by phone as soon as possible.  Submit refill requests through [employer] or call your pharmacy and they will forward the refill request to us. Please allow 3 business days for your refill to be completed.          Additional Information About Your Visit        MyChart Information     [employer] lets you send messages to your doctor, view your test results, renew your prescriptions, schedule appointments and more. To sign up, go to [email or web address] . Click on \"Log in\" on the left side of the screen, which will take you to the Welcome page. Then click on \"Sign up Now\" on the right side of the page.     You will be asked to enter the access code listed below, as well as some personal information. Please follow the directions to create your username and password.     Your access " "code is: [email or web address]  Expires: 5/3/2018 12:37 PM     Your access code will  in 90 days. If you need help or a new code, please call your [facility] or [phone].        Care EveryWhere ID     This is your Care EveryWhere ID. This could be used by other organizations to access your [place] medical records  [id number]        Your Vitals Were     Pulse Temperature Respirations Height Pulse Oximetry BMI (Body Mass Index)    68 96.6  F (35.9  C) (Tympanic) 20 5' 7\" (1.702 m) 96% 39.78 kg/m2       Blood Pressure from Last 3 Encounters:   18 120/82   18 118/82   05/15/17 121/82    Weight from Last 3 Encounters:   18 254 lb (115.2 kg)   18 254 lb (115.2 kg)   05/15/17 254 lb 6.4 oz (115.4 kg)              Today, you had the following     No orders found for display         Today's Medication Changes          These changes are accurate as of 18 10:43 AM.  If you have any questions, ask your nurse or doctor.               These medicines have changed or have updated prescriptions.        Dose/Directions    * HYDROcodone-acetaminophen  MG per tablet   Commonly known as:  NORCO   This may have changed:  Another medication with the same name was added. Make sure you understand how and when to take each.   Used for:  Acquired spondylolisthesis of lumbosacral region   Changed by:  Kale Story MD        Dose:  1-2 tablet   Take 1-2 tablets by mouth every 4 hours as needed for moderate to severe pain   Quantity:  120 tablet   Refills:  0       * HYDROcodone-acetaminophen 5-325 MG per tablet   Commonly known as:  NORCO   This may have changed:  You were already taking a medication with the same name, and this prescription was added. Make sure you understand how and when to take each.   Used for:  Vasectomy evaluation   Changed by:  Kale Story MD        Dose:  1-2 tablet   Take 1-2 tablets by mouth every 4 hours as needed Take 1-2 tablets by mouth every 4-6 hours as needed for " pain   Quantity:  12 tablet   Refills:  0       * Notice:  This list has 2 medication(s) that are the same as other medications prescribed for you. Read the directions carefully, and ask your doctor or other care provider to review them with you.         Where to get your medicines      Some of these will need a paper prescription and others can be bought over the counter.  Ask your nurse if you have questions.     Bring a paper prescription for each of these medications     HYDROcodone-acetaminophen 5-325 MG per tablet                Primary Care Provider Office Phone # Fax #    Gabrielakimberly Morales [phone] [address] [facility] [address]        Equal Access to Services     [facility] : Hadii rose Dangelo, waaxda didier, qaybta kaalmavivian arguello, janina cobb . So [facility] [phone].    ATENCIÓN: Si habla español, tiene a young disposición servicios gratuitos de asistencia lingüística. [facility] [phone].    We comply with applicable federal civil rights laws and [place] laws. We do not discriminate on the basis of race, color, national origin, age, disability, sex, sexual orientation, or gender identity.            Thank you!     Thank you for choosing [facility]  for your care. Our goal is always to provide you with excellent care. Hearing back from our patients is one way we can continue to improve our services. Please take a few minutes to complete the written survey that you may receive in the mail after your visit with us. Thank you!             Your Updated Medication List - Protect others around you: Learn how to safely use, store and throw away your medicines at www.disposemymeds.org.          This list is accurate as of 2/28/18 10:43 AM.  Always use your most recent med list.                   Brand Name Dispense Instructions for use Diagnosis    albuterol 108 (90 BASE) MCG/ACT Inhaler    PROAIR  HFA/PROVENTIL HFA/VENTOLIN HFA    1 Inhaler    Inhale 2 puffs into the lungs every 6 hours as needed for shortness of breath / dyspnea    SOB (shortness of breath)       cyclobenzaprine 10 MG tablet    FLEXERIL    120 tablet    Take 1 tablet (10 mg) by mouth 3 times daily as needed for muscle spasms    Muscle spasm       EPINEPHrine 0.3 MG/0.3ML injection 2-pack    EPIPEN 2-MIKE    1 each    Inject 0.3 mLs (0.3 mg) into the muscle once as needed for anaphylaxis    Bee sting reaction       * HYDROcodone-acetaminophen  MG per tablet    NORCO    120 tablet    Take 1-2 tablets by mouth every 4 hours as needed for moderate to severe pain    Acquired spondylolisthesis of lumbosacral region       * HYDROcodone-acetaminophen 5-325 MG per tablet    NORCO    12 tablet    Take 1-2 tablets by mouth every 4 hours as needed Take 1-2 tablets by mouth every 4-6 hours as needed for pain    Vasectomy evaluation       ibuprofen 200 MG capsule      Take with food or milk.    2 capsules as needed.        naproxen 500 MG tablet    NAPROSYN     Take 500 mg by mouth        * Notice:  This list has 2 medication(s) that are the same as other medications prescribed for you. Read the directions carefully, and ask your doctor or other care provider to review them with you.

## 2018-03-13 ENCOUNTER — OFFICE VISIT (OUTPATIENT)
Dept: FAMILY MEDICINE | Facility: OTHER | Age: 38
End: 2018-03-13
Attending: FAMILY MEDICINE
Payer: COMMERCIAL

## 2018-03-13 VITALS
HEART RATE: 82 BPM | BODY MASS INDEX: 39.24 KG/M2 | DIASTOLIC BLOOD PRESSURE: 88 MMHG | TEMPERATURE: 98.5 F | OXYGEN SATURATION: 97 % | SYSTOLIC BLOOD PRESSURE: 130 MMHG | HEIGHT: 67 IN | WEIGHT: 250 LBS | RESPIRATION RATE: 18 BRPM

## 2018-03-13 DIAGNOSIS — Z01.818 PREOP GENERAL PHYSICAL EXAM: Primary | ICD-10-CM

## 2018-03-13 DIAGNOSIS — Z23 NEED FOR VACCINATION: ICD-10-CM

## 2018-03-13 PROBLEM — S22.31XD CLOSED FRACTURE OF ONE RIB OF RIGHT SIDE WITH ROUTINE HEALING: Status: ACTIVE | Noted: 2017-11-27

## 2018-03-13 PROCEDURE — 99214 OFFICE O/P EST MOD 30 MIN: CPT | Mod: 25 | Performed by: FAMILY MEDICINE

## 2018-03-13 PROCEDURE — 90715 TDAP VACCINE 7 YRS/> IM: CPT | Performed by: FAMILY MEDICINE

## 2018-03-13 PROCEDURE — 90471 IMMUNIZATION ADMIN: CPT | Performed by: FAMILY MEDICINE

## 2018-03-13 ASSESSMENT — ANXIETY QUESTIONNAIRES
3. WORRYING TOO MUCH ABOUT DIFFERENT THINGS: NOT AT ALL
1. FEELING NERVOUS, ANXIOUS, OR ON EDGE: NOT AT ALL
2. NOT BEING ABLE TO STOP OR CONTROL WORRYING: NOT AT ALL
GAD7 TOTAL SCORE: 0
5. BEING SO RESTLESS THAT IT IS HARD TO SIT STILL: NOT AT ALL
6. BECOMING EASILY ANNOYED OR IRRITABLE: NOT AT ALL
IF YOU CHECKED OFF ANY PROBLEMS ON THIS QUESTIONNAIRE, HOW DIFFICULT HAVE THESE PROBLEMS MADE IT FOR YOU TO DO YOUR WORK, TAKE CARE OF THINGS AT HOME, OR GET ALONG WITH OTHER PEOPLE: NOT DIFFICULT AT ALL
4. TROUBLE RELAXING: NOT AT ALL
7. FEELING AFRAID AS IF SOMETHING AWFUL MIGHT HAPPEN: NOT AT ALL

## 2018-03-13 ASSESSMENT — PAIN SCALES - GENERAL: PAINLEVEL: NO PAIN (0)

## 2018-03-13 NOTE — PROGRESS NOTES
[facility]  [address]  [facility] [address]  [phone]  Dept: [phone]    PRE-OP EVALUATION:  Today's date: 3/13/2018    Kale Rosario (: 1980) presents for pre-operative evaluation assessment as requested by Dr. Story.  He requires evaluation and anesthesia risk assessment prior to undergoing surgery/procedure for treatment of Vasectomy .    Proposed Surgery/ Procedure: Vasectomy  Date of Surgery/ Procedure: 3/20/18  Time of Surgery/ Procedure: To be Determined   Hospital/Surgical Facility: [facility]     Primary Physician: Gabriela Morales  Type of Anesthesia Anticipated: to be determined    Patient has a Health Care Directive or Living Will:  NO    1. NO - Do you have a history of heart attack, stroke, stent, bypass or surgery on an artery in the head, neck, heart or legs?  2. NO - Do you ever have any pain or discomfort in your chest?  3. NO - Do you have a history of  Heart Failure?  4. NO - Are you troubled by shortness of breath when: walking on the level, up a slight hill or at night?  5. NO - Do you currently have a cold, bronchitis or other respiratory infection?  6. NO - Do you have a cough, shortness of breath or wheezing?  7. NO - Do you sometimes get pains in the calves of your legs when you walk?  8. NO - Do you or anyone in your family have previous history of blood clots?  9. NO - Do you or does anyone in your family have a serious bleeding problem such as prolonged bleeding following surgeries or cuts?  10. NO - Have you ever had problems with anemia or been told to take iron pills?  11. NO - Have you had any abnormal blood loss such as black, tarry or bloody stools, or abnormal vaginal bleeding?  12. NO - Have you ever had a blood transfusion?  13. NO - Have you or any of your relatives ever had problems with anesthesia?  14. NO - Do you have sleep apnea, excessive snoring or daytime drowsiness?  15. NO - Do you have any prosthetic heart  valves?  16. NO - Do you have prosthetic joints?  17. NO - Is there any chance that you may be pregnant?      HPI:     HPI related to upcoming procedure: Desires vasectomy      See problem list for active medical problems.  Problems all longstanding and stable, except as noted/documented.  See ROS for pertinent symptoms related to these conditions.                                                                                                  .    MEDICAL HISTORY:     Patient Active Problem List    Diagnosis Date Noted     Morbid obesity (H) 02/02/2018     Priority: Medium     Closed fracture of one rib of right side with routine healing 11/27/2017     Priority: Medium     Lumbar radicular pain 06/01/2016     Priority: Medium     Acquired spondylolisthesis of lumbosacral region 04/15/2016     Priority: Medium     Lumbosacral radiculopathy at L3 04/15/2016     Priority: Medium     Episodic mood disorder (H) 04/01/2014     Priority: Medium     Hyperlipidemia with target LDL less than 100 04/01/2014     Priority: Medium     History of acute pancreatitis      Priority: Medium     hospitalized 2013       Asthma 02/21/2011     Priority: Medium      Past Medical History:   Diagnosis Date     Asthma,unspecified type, unspecified 02/21/2011     History of acute pancreatitis     hospitalized 2013     Hyperlipidemia LDL goal < 100 4/1/2014     Unspecified episodic mood disorder 4/1/2014     Past Surgical History:   Procedure Laterality Date     APPENDECTOMY  1995     LASIK 2010    Bilateral     OPTICAL TRACKING SYSTEM FUSION POSTERIOR SPINE LUMBAR Right 6/1/2016    Procedure: OPTICAL TRACKING SYSTEM FUSION SPINE POSTERIOR LUMBAR ONE LEVEL;  Surgeon: Devon Summers MD;  Location: [facility] OR     ORTHOPEDIC SURGERY  6/1/16    L 3-L4 fusion      Current Outpatient Prescriptions   Medication Sig Dispense Refill     HYDROcodone-acetaminophen (NORCO) 5-325 MG per tablet Take 1-2 tablets by mouth every 4 hours as needed Take 1-2  "tablets by mouth every 4-6 hours as needed for pain 12 tablet 0     ibuprofen 200 MG capsule Take with food or milk.    2 capsules as needed.       naproxen (NAPROSYN) 500 MG tablet Take 500 mg by mouth       HYDROcodone-acetaminophen (NORCO)  MG per tablet Take 1-2 tablets by mouth every 4 hours as needed for moderate to severe pain 120 tablet 0     cyclobenzaprine (FLEXERIL) 10 MG tablet Take 1 tablet (10 mg) by mouth 3 times daily as needed for muscle spasms 120 tablet 0     EPINEPHrine (EPIPEN 2-MIKE) 0.3 MG/0.3ML injection Inject 0.3 mLs (0.3 mg) into the muscle once as needed for anaphylaxis 1 each 2     albuterol (PROAIR HFA, PROVENTIL HFA, VENTOLIN HFA) 108 (90 BASE) MCG/ACT inhaler Inhale 2 puffs into the lungs every 6 hours as needed for shortness of breath / dyspnea 1 Inhaler 1     OTC products: None, except as noted above    Allergies   Allergen Reactions     Penicillins      Bees Swelling      Latex Allergy: NO    Social History   Substance Use Topics     Smoking status: Never Smoker     Smokeless tobacco: Never Used     Alcohol use Yes      Comment: OCCASIONAL     History   Drug Use No       REVIEW OF SYSTEMS:   Constitutional, neuro, ENT, endocrine, pulmonary, cardiac, gastrointestinal, genitourinary, musculoskeletal, integument and psychiatric systems are negative, except as otherwise noted.    EXAM:   /88 (BP Location: Left arm, Patient Position: Chair, Cuff Size: Adult Large)  Pulse 82  Temp 98.5  F (36.9  C) (Tympanic)  Resp 18  Ht 5' 7\" (1.702 m)  Wt 250 lb (113.4 kg)  SpO2 97%  BMI 39.16 kg/m2    GENERAL APPEARANCE: healthy, alert and no distress     EYES: EOMI,  PERRL     HENT: ear canals and TM's normal and nose and mouth without ulcers or lesions     NECK: no adenopathy     RESP: lungs clear to auscultation - no rales, rhonchi or wheezes     CV: regular rates and rhythm, normal S1 S2, no S3 or S4 and no murmur, click or rub     ABDOMEN:  soft, nontender, no HSM or masses and " bowel sounds normal     SKIN: no suspicious lesions or rashes     NEURO: Normal strength and tone, sensory exam grossly normal, mentation intact and speech normal     PSYCH: mentation appears normal. and affect normal/bright    DIAGNOSTICS:   No labs or EKG required for low risk surgery     Recent Labs   Lab Test  03/15/17   0730  06/02/16   0738  05/31/16   1547   HGB  12.8*  12.0*  13.5   PLT   --   253  294   NA   --   140  140   POTASSIUM   --   4.0  4.5   CR   --   0.86  0.99        IMPRESSION:   Reason for surgery/procedure: Vasectomy  Diagnosis/reason for consult: Cardiopulmonary clearance    The proposed surgical procedure is considered LOW risk.    REVISED CARDIAC RISK INDEX  The patient has the following serious cardiovascular risks for perioperative complications such as (MI, PE, VFib and 3  AV Block):  No serious cardiac risks  INTERPRETATION: 0 risks: Class I (very low risk - 0.4% complication rate)    The patient has the following additional risks for perioperative complications:  No identified additional risks      ICD-10-CM    1. Preop general physical exam Z01.818    2. Need for vaccination Z23 TDAP VACCINE (ADACEL) [55580.002]     1st  Administration  [11836]       RECOMMENDATIONS:       Cardiovascular Risk  Performs 4 METs exercise without symptoms (Light housework (dusting, washing dishes), Climb a flight of stairs and Walk on level ground at 15 minutes per mile (4 miles/hour)) .       --Patient is to take all scheduled medications on the day of surgery EXCEPT for modifications listed below.    Anticoagulant or Antiplatelet Medication Use  Hold all ASA/NSAIDs/Vitamins/Supplements 7-10 days prior to procedure         APPROVAL GIVEN to proceed with proposed procedure, without further diagnostic evaluation       Signed Electronically by: Gabriela Morales MD    Copy of this evaluation report is provided to requesting physician.    [facility] Preop Guidelines

## 2018-03-13 NOTE — NURSING NOTE
"Chief Complaint   Patient presents with     Pre-Op Exam     vasectomy dr Joshua Castellanos 3/20/15       Initial /88 (BP Location: Left arm, Patient Position: Chair, Cuff Size: Adult Large)  Pulse 82  Temp 98.5  F (36.9  C) (Tympanic)  Resp 18  Ht 5' 7\" (1.702 m)  Wt 235 lb (106.6 kg)  SpO2 97%  BMI 36.81 kg/m2 Estimated body mass index is 36.81 kg/(m^2) as calculated from the following:    Height as of this encounter: 5' 7\" (1.702 m).    Weight as of this encounter: 235 lb (106.6 kg).  Medication Reconciliation: complete   Pamela M Lechevalier LPN      "

## 2018-03-13 NOTE — MR AVS SNAPSHOT
After Visit Summary   3/13/2018    Kale Rosario    MRN: [id number]           Patient Information     Date Of Birth          1980        Visit Information        Provider Department      3/13/2018 2:30 PM Gabriela Morales MD [facility]        Today's Diagnoses     Preop general physical exam    -  1    Need for vaccination          Care Instructions      Before Your Surgery      Call your surgeon if there is any change in your health. This includes signs of a cold or flu (such as a sore throat, runny nose, cough, rash or fever).    Do not smoke, drink alcohol or take over the counter medicine (unless your surgeon or primary care doctor tells you to) for the 24 hours before and after surgery.    If you take prescribed drugs: Follow your doctor s orders about which medicines to take and which to stop until after surgery.    Eating and drinking prior to surgery: follow the instructions from your surgeon    Take a shower or bath the night before surgery. Use the soap your surgeon gave you to gently clean your skin. If you do not have soap from your surgeon, use your regular soap. Do not shave or scrub the surgery site.  Wear clean pajamas and have clean sheets on your bed.           Follow-ups after your visit        Follow-up notes from your care team     Return if symptoms worsen or fail to improve.      Your next 10 appointments already scheduled     Mar 20, 2018   Procedure with Kale Story MD   [facility] ([facility])    [address]  [place] [id number] [phone]              Who to contact     If you have questions or need follow up information about today's clinic visit or your schedule please contact [facility] directly at [phone].  Normal or non-critical lab and imaging results will be communicated to you by MyChart, letter or phone within 4 business days after the clinic has received the  "results. If you do not hear from us within 7 days, please contact the clinic through [employer] or phone. If you have a critical or abnormal lab result, we will notify you by phone as soon as possible.  Submit refill requests through [employer] or call your pharmacy and they will forward the refill request to us. Please allow 3 business days for your refill to be completed.          Additional Information About Your Visit        [employer] Information     [employer] lets you send messages to your doctor, view your test results, renew your prescriptions, schedule appointments and more. To sign up, go to [email or web address] . Click on \"Log in\" on the left side of the screen, which will take you to the Welcome page. Then click on \"Sign up Now\" on the right side of the page.     You will be asked to enter the access code listed below, as well as some personal information. Please follow the directions to create your username and password.     Your access code is: [email or web address]  Expires: 5/3/2018  1:37 PM     Your access code will  in 90 days. If you need help or a new code, please call your [place] clinic or [phone].        Care EveryWhere ID     This is your Care EveryWhere ID. This could be used by other organizations to access your [place] medical records  [id number]        Your Vitals Were     Pulse Temperature Respirations Height Pulse Oximetry BMI (Body Mass Index)    82 98.5  F (36.9  C) (Tympanic) 18 5' 7\" (1.702 m) 97% 39.16 kg/m2       Blood Pressure from Last 3 Encounters:   18 130/88   18 120/82   18 118/82    Weight from Last 3 Encounters:   18 250 lb (113.4 kg)   18 254 lb (115.2 kg)   18 254 lb (115.2 kg)              We Performed the Following     1st  Administration  [18632]     TDAP VACCINE (ADACEL) [64820.002]          Today's Medication Changes          These changes are accurate as of 3/13/18  3:02 PM.  If you have any questions, ask your nurse or doctor.    "            These medicines have changed or have updated prescriptions.        Dose/Directions    HYDROcodone-acetaminophen 5-325 MG per tablet   Commonly known as:  NORCO   This may have changed:  Another medication with the same name was removed. Continue taking this medication, and follow the directions you see here.   Used for:  Vasectomy evaluation   Changed by:  Gabriela Morales MD        Dose:  1-2 tablet   Take 1-2 tablets by mouth every 4 hours as needed Take 1-2 tablets by mouth every 4-6 hours as needed for pain   Quantity:  12 tablet   Refills:  0                Primary Care Provider Office Phone # Fax #    Gabriela Morales [phone] [address] [facility] [id number]        Equal Access to Services     : Shaina Dangelo, estuardo velásquez, amber arguello, janina cobb . So [facility] [phone].    ATENCIÓN: Si habla español, tiene a young disposición servicios gratuitos de asistencia lingüística. Llame al [phone].    We comply with applicable federal civil rights laws and [place] laws. We do not discriminate on the basis of race, color, national origin, age, disability, sex, sexual orientation, or gender identity.            Thank you!     Thank you for choosing [facility]  for your care. Our goal is always to provide you with excellent care. Hearing back from our patients is one way we can continue to improve our services. Please take a few minutes to complete the written survey that you may receive in the mail after your visit with us. Thank you!             Your Updated Medication List - Protect others around you: Learn how to safely use, store and throw away your medicines at www.disposemymeds.org.          This list is accurate as of 3/13/18  3:02 PM.  Always use your most recent med list.                   Brand Name Dispense Instructions for use Diagnosis    albuterol 108 (90  BASE) MCG/ACT Inhaler    PROAIR HFA/PROVENTIL HFA/VENTOLIN HFA    1 Inhaler    Inhale 2 puffs into the lungs every 6 hours as needed for shortness of breath / dyspnea    SOB (shortness of breath)       cyclobenzaprine 10 MG tablet    FLEXERIL    120 tablet    Take 1 tablet (10 mg) by mouth 3 times daily as needed for muscle spasms    Muscle spasm       EPINEPHrine 0.3 MG/0.3ML injection 2-pack    EPIPEN 2-MIKE    1 each    Inject 0.3 mLs (0.3 mg) into the muscle once as needed for anaphylaxis    Bee sting reaction       HYDROcodone-acetaminophen 5-325 MG per tablet    NORCO    12 tablet    Take 1-2 tablets by mouth every 4 hours as needed Take 1-2 tablets by mouth every 4-6 hours as needed for pain    Vasectomy evaluation       ibuprofen 200 MG capsule      Take with food or milk.    2 capsules as needed.        naproxen 500 MG tablet    NAPROSYN     Take 500 mg by mouth

## 2018-03-14 ASSESSMENT — ANXIETY QUESTIONNAIRES: GAD7 TOTAL SCORE: 0

## 2018-03-14 ASSESSMENT — PATIENT HEALTH QUESTIONNAIRE - PHQ9: SUM OF ALL RESPONSES TO PHQ QUESTIONS 1-9: 0

## 2018-03-19 ENCOUNTER — ANESTHESIA EVENT (OUTPATIENT)
Dept: SURGERY | Facility: OTHER | Age: 38
End: 2018-03-19
Payer: COMMERCIAL

## 2018-03-20 ENCOUNTER — ANESTHESIA (OUTPATIENT)
Dept: SURGERY | Facility: OTHER | Age: 38
End: 2018-03-20
Payer: COMMERCIAL

## 2018-03-20 ENCOUNTER — SURGERY (OUTPATIENT)
Age: 38
End: 2018-03-20

## 2018-03-20 ENCOUNTER — HOSPITAL ENCOUNTER (OUTPATIENT)
Facility: OTHER | Age: 38
Discharge: HOME | End: 2018-03-20
Attending: UROLOGY | Admitting: UROLOGY
Payer: COMMERCIAL

## 2018-03-20 ENCOUNTER — TELEPHONE (OUTPATIENT)
Dept: FAMILY MEDICINE | Facility: OTHER | Age: 38
End: 2018-03-20

## 2018-03-20 VITALS
HEIGHT: 67 IN | BODY MASS INDEX: 39.24 KG/M2 | WEIGHT: 250 LBS | RESPIRATION RATE: 14 BRPM | TEMPERATURE: 97.3 F | SYSTOLIC BLOOD PRESSURE: 109 MMHG | HEART RATE: 67 BPM | DIASTOLIC BLOOD PRESSURE: 68 MMHG | OXYGEN SATURATION: 97 %

## 2018-03-20 PROCEDURE — 55250 REMOVAL OF SPERM DUCT(S): CPT | Performed by: UROLOGY

## 2018-03-20 PROCEDURE — 25000128 H RX IP 250 OP 636: Performed by: NURSE ANESTHETIST, CERTIFIED REGISTERED

## 2018-03-20 PROCEDURE — 88302 TISSUE EXAM BY PATHOLOGIST: CPT

## 2018-03-20 PROCEDURE — 36000052 ZZH SURGERY LEVEL 2 EA 15 ADDTL MIN: Performed by: UROLOGY

## 2018-03-20 PROCEDURE — 37000009 ZZH ANESTHESIA TECHNICAL FEE, EACH ADDTL 15 MIN: Performed by: UROLOGY

## 2018-03-20 PROCEDURE — 27210794 ZZH OR GENERAL SUPPLY STERILE: Performed by: UROLOGY

## 2018-03-20 PROCEDURE — 55250 REMOVAL OF SPERM DUCT(S): CPT

## 2018-03-20 PROCEDURE — 36000050 ZZH SURGERY LEVEL 2 1ST 30 MIN: Performed by: UROLOGY

## 2018-03-20 PROCEDURE — 25000125 ZZHC RX 250: Performed by: UROLOGY

## 2018-03-20 PROCEDURE — 25000125 ZZHC RX 250: Performed by: NURSE ANESTHETIST, CERTIFIED REGISTERED

## 2018-03-20 PROCEDURE — 37000008 ZZH ANESTHESIA TECHNICAL FEE, 1ST 30 MIN: Performed by: UROLOGY

## 2018-03-20 PROCEDURE — 71000027 ZZH RECOVERY PHASE 2 EACH 15 MINS: Performed by: UROLOGY

## 2018-03-20 PROCEDURE — 40000306 ZZH STATISTIC PRE PROC ASSESS II: Performed by: UROLOGY

## 2018-03-20 RX ORDER — DEXAMETHASONE SODIUM PHOSPHATE 4 MG/ML
4 INJECTION, SOLUTION INTRA-ARTICULAR; INTRALESIONAL; INTRAMUSCULAR; INTRAVENOUS; SOFT TISSUE EVERY 10 MIN PRN
Status: DISCONTINUED | OUTPATIENT
Start: 2018-03-20 | End: 2018-03-20 | Stop reason: HOSPADM

## 2018-03-20 RX ORDER — PROPOFOL 10 MG/ML
INJECTION, EMULSION INTRAVENOUS PRN
Status: DISCONTINUED | OUTPATIENT
Start: 2018-03-20 | End: 2018-03-20

## 2018-03-20 RX ORDER — LIDOCAINE 40 MG/G
CREAM TOPICAL
Status: DISCONTINUED | OUTPATIENT
Start: 2018-03-20 | End: 2018-03-20 | Stop reason: HOSPADM

## 2018-03-20 RX ORDER — FENTANYL CITRATE 50 UG/ML
25-50 INJECTION, SOLUTION INTRAMUSCULAR; INTRAVENOUS EVERY 5 MIN PRN
Status: DISCONTINUED | OUTPATIENT
Start: 2018-03-20 | End: 2018-03-20 | Stop reason: HOSPADM

## 2018-03-20 RX ORDER — MEPERIDINE HYDROCHLORIDE 50 MG/ML
12.5 INJECTION INTRAMUSCULAR; INTRAVENOUS; SUBCUTANEOUS
Status: DISCONTINUED | OUTPATIENT
Start: 2018-03-20 | End: 2018-03-20 | Stop reason: HOSPADM

## 2018-03-20 RX ORDER — KETOROLAC TROMETHAMINE 30 MG/ML
30 INJECTION, SOLUTION INTRAMUSCULAR; INTRAVENOUS EVERY 6 HOURS PRN
Status: DISCONTINUED | OUTPATIENT
Start: 2018-03-20 | End: 2018-03-20 | Stop reason: HOSPADM

## 2018-03-20 RX ORDER — LIDOCAINE HYDROCHLORIDE 20 MG/ML
INJECTION, SOLUTION INFILTRATION; PERINEURAL PRN
Status: DISCONTINUED | OUTPATIENT
Start: 2018-03-20 | End: 2018-03-20

## 2018-03-20 RX ORDER — NALOXONE HYDROCHLORIDE 0.4 MG/ML
.1-.4 INJECTION, SOLUTION INTRAMUSCULAR; INTRAVENOUS; SUBCUTANEOUS
Status: DISCONTINUED | OUTPATIENT
Start: 2018-03-20 | End: 2018-03-20 | Stop reason: HOSPADM

## 2018-03-20 RX ORDER — HYDROCODONE BITARTRATE AND ACETAMINOPHEN 5; 325 MG/1; MG/1
2 TABLET ORAL ONCE
Status: DISCONTINUED | OUTPATIENT
Start: 2018-03-20 | End: 2018-03-20 | Stop reason: HOSPADM

## 2018-03-20 RX ORDER — SODIUM CHLORIDE, SODIUM LACTATE, POTASSIUM CHLORIDE, CALCIUM CHLORIDE 600; 310; 30; 20 MG/100ML; MG/100ML; MG/100ML; MG/100ML
INJECTION, SOLUTION INTRAVENOUS CONTINUOUS
Status: DISCONTINUED | OUTPATIENT
Start: 2018-03-20 | End: 2018-03-20 | Stop reason: HOSPADM

## 2018-03-20 RX ORDER — HYDROMORPHONE HYDROCHLORIDE 1 MG/ML
.3-.5 INJECTION, SOLUTION INTRAMUSCULAR; INTRAVENOUS; SUBCUTANEOUS EVERY 10 MIN PRN
Status: DISCONTINUED | OUTPATIENT
Start: 2018-03-20 | End: 2018-03-20 | Stop reason: HOSPADM

## 2018-03-20 RX ORDER — GINSENG 100 MG
CAPSULE ORAL PRN
Status: DISCONTINUED | OUTPATIENT
Start: 2018-03-20 | End: 2018-03-20 | Stop reason: HOSPADM

## 2018-03-20 RX ORDER — ONDANSETRON 2 MG/ML
4 INJECTION INTRAMUSCULAR; INTRAVENOUS EVERY 30 MIN PRN
Status: DISCONTINUED | OUTPATIENT
Start: 2018-03-20 | End: 2018-03-20 | Stop reason: HOSPADM

## 2018-03-20 RX ORDER — ONDANSETRON 4 MG/1
4 TABLET, ORALLY DISINTEGRATING ORAL EVERY 30 MIN PRN
Status: DISCONTINUED | OUTPATIENT
Start: 2018-03-20 | End: 2018-03-20 | Stop reason: HOSPADM

## 2018-03-20 RX ORDER — PROPOFOL 10 MG/ML
INJECTION, EMULSION INTRAVENOUS CONTINUOUS PRN
Status: DISCONTINUED | OUTPATIENT
Start: 2018-03-20 | End: 2018-03-20

## 2018-03-20 RX ORDER — LIDOCAINE HYDROCHLORIDE 20 MG/ML
INJECTION, SOLUTION INFILTRATION; PERINEURAL PRN
Status: DISCONTINUED | OUTPATIENT
Start: 2018-03-20 | End: 2018-03-20 | Stop reason: HOSPADM

## 2018-03-20 RX ADMIN — BACITRACIN 1 G: 500 OINTMENT TOPICAL at 08:27

## 2018-03-20 RX ADMIN — PROPOFOL 150 MG: 10 INJECTION, EMULSION INTRAVENOUS at 08:03

## 2018-03-20 RX ADMIN — LIDOCAINE HYDROCHLORIDE 5 ML: 20 INJECTION, SOLUTION INFILTRATION; PERINEURAL at 08:28

## 2018-03-20 RX ADMIN — SODIUM CHLORIDE, SODIUM LACTATE, POTASSIUM CHLORIDE, AND CALCIUM CHLORIDE: 600; 310; 30; 20 INJECTION, SOLUTION INTRAVENOUS at 07:13

## 2018-03-20 RX ADMIN — PROPOFOL 140 MCG/KG/MIN: 10 INJECTION, EMULSION INTRAVENOUS at 08:03

## 2018-03-20 RX ADMIN — LIDOCAINE HYDROCHLORIDE 40 MG: 20 INJECTION, SOLUTION INFILTRATION; PERINEURAL at 08:03

## 2018-03-20 NOTE — ANESTHESIA PREPROCEDURE EVALUATION
Anesthesia Evaluation     .             ROS/MED HX    ENT/Pulmonary:     (+)Intermittent asthma Treatment: Inhaler prn,  , . .    Neurologic:     (+)other neuro Spondylolisthesis lumbar region - s/p fusion     Cardiovascular:  - neg cardiovascular ROS       METS/Exercise Tolerance:     Hematologic:  - neg hematologic  ROS       Musculoskeletal:  - neg musculoskeletal ROS       GI/Hepatic:  - neg GI/hepatic ROS       Renal/Genitourinary:  - ROS Renal section negative       Endo:     (+) Obesity, .      Psychiatric:  - neg psychiatric ROS       Infectious Disease:  - neg infectious disease ROS       Malignancy:      - no malignancy   Other:    - neg other ROS                 Physical Exam  Normal systems: cardiovascular, pulmonary and dental    Airway   Mallampati: II  TM distance: >3 FB  Neck ROM: full    Dental     Cardiovascular   Rhythm and rate: regular and normal      Pulmonary                     Anesthesia Plan      History & Physical Review      ASA Status:  2 .    NPO Status:  > 8 hours    Plan for MAC with Intravenous and Propofol induction.          Postoperative Care      Consents  Anesthetic plan, risks, benefits and alternatives discussed with:  Patient..                          .

## 2018-03-20 NOTE — TELEPHONE ENCOUNTER
10:34 AM    Reason for Call: Phone Call    Description: Maria C from [employer] would like a call back on the patient, she is calling in to see if he had his vasectomy today.    Was an appointment offered for this call? No  If yes : Appointment type              Date    Preferred method for responding to this message: Telephone Call  What is your phone number ? [phone] Ext [phone]     If we cannot reach you directly, may we leave a detailed response at the number you provided? Yes    Can this message wait until your PCP/provider returns, if available today? Not applicable,     Larissa Bledsoe

## 2018-03-20 NOTE — OR NURSING
Pt has been discharged to home at 0910 via ambulatory accompanied by mother    Written discharge instructions were provided to Pt.  Prescriptions were N/A.      Patient and adult caring for them verbalize understanding of discharge instructions including no driving until tomorrow and no longer taking narcotic pain medications - no operating mechanical equipment and no making any important decisions.They understand reason for discharge, and necessary follow-up appointments.      Yumiko Card RN

## 2018-03-20 NOTE — IP AVS SNAPSHOT
[facility]    [address]    [place] [address]    Phone:  [phone]    Fax:  [phone]                                       After Visit Summary   3/20/2018    Kale Rosario    MRN: [id number]           After Visit Summary Signature Page     I have received my discharge instructions, and my questions have been answered. I have discussed any challenges I see with this plan with the nurse or doctor.    ..........................................................................................................................................  Patient/Patient Representative Signature      ..........................................................................................................................................  Patient Representative Print Name and Relationship to Patient    ..................................................               ................................................  Date                                            Time    ..........................................................................................................................................  Reviewed by Signature/Title    ...................................................              ..............................................  Date                                                            Time

## 2018-03-20 NOTE — IP AVS SNAPSHOT
MRN:[id number]                      After Visit Summary   3/20/2018    Kale Rosario    MRN: [id number]           Thank you!     Thank you for choosing [facility] for your care. Our goal is always to provide you with excellent care. Hearing back from our patients is one way we can continue to improve our services. Please take a few minutes to complete the written survey that you may receive in the mail after you visit with us. Thank you!        Patient Information     Date Of Birth          1980        About your hospital stay     You were admitted on:  March 20, 2018 You last received care in the:  [facility]    You were discharged on:  March 20, 2018       Who to Call     For medical emergencies, please call 911.  For non-urgent questions about your medical care, please call your primary care provider or clinic, [phone]  For questions related to your surgery, please call your surgery clinic        Attending Provider     Provider Specialty    Kale Story MD Urology       Primary Care Provider Office Phone # Fax #    Gbariela Morales [phone]      After Care Instructions     Diet Instructions       Resume pre procedure diet            Discharge Instructions       Follow up labs in about 3 months for semen analysis (no appt - just a lab drop off)            Discharge Instructions       Wear jock strap continuously until soreness minimal.  Take scheduled ibuprofen 800mg three times daily x 3 days            Dressing       Apply Vaseline to stitches multiple times a day until dissolved            Encourage fluids       Encourage fluids at home to keep urine clear to light pink            Ice to affected area       Frozen peas to operative site.  PRN as tolerated            No Alcohol       for 24 hours post procedure            No driving or operating machinery        until the day after procedure                  Pending Results     No orders  "found from 3/18/2018 to 3/21/2018.            Admission Information     Date & Time Provider Department Dept. Phone    3/20/2018 Kale Story MD [facility] [phone]      Your Vitals Were     Blood Pressure Pulse Temperature Respirations Height Weight    91/72 67 97.3  F (36.3  C) (Temporal) 14 1.702 m (5' 7\") 113.4 kg (250 lb)    Pulse Oximetry BMI (Body Mass Index)                96% 39.16 kg/m2          [employer] Liaison Information     [employer] lets you send messages to your doctor, view your test results, renew your prescriptions, schedule appointments and more. To sign up, go to [email or web address] . Click on \"Log in\" on the left side of the screen, which will take you to the Welcome page. Then click on \"Sign up Now\" on the right side of the page.     You will be asked to enter the access code listed below, as well as some personal information. Please follow the directions to create your username and password.     Your access code is: [email or web address]  Expires: 5/3/2018  1:37 PM     Your access code will  in 90 days. If you need help or a new code, please call your [place] clinic or [phone].        Care EveryWhere ID     This is your Care EveryWhere ID. This could be used by other organizations to access your [place] medical records  [id number]        Equal Access to Services     JORGE ALBERTO PITTMAN AH: Hadii rose peraltao Soafua, waaxda luqadaha, qaybta kaalmada adejvyada, janina quintanilla. So [facility] [phone].    ATENCIÓN: Si habla español, tiene a young disposición servicios gratuitos de asistencia lingüística. Llhomer al [phone].    We comply with applicable federal civil rights laws and [place] laws. We do not discriminate on the basis of race, color, national origin, age, disability, sex, sexual orientation, or gender identity.               Review of your medicines      CONTINUE these medicines which have NOT CHANGED        Dose / Directions    " albuterol 108 (90 BASE) MCG/ACT Inhaler   Commonly known as:  PROAIR HFA/PROVENTIL HFA/VENTOLIN HFA   Used for:  SOB (shortness of breath)        Dose:  2 puff   Inhale 2 puffs into the lungs every 6 hours as needed for shortness of breath / dyspnea   Quantity:  1 Inhaler   Refills:  1       cyclobenzaprine 10 MG tablet   Commonly known as:  FLEXERIL   Used for:  Muscle spasm        Dose:  10 mg   Take 1 tablet (10 mg) by mouth 3 times daily as needed for muscle spasms   Quantity:  120 tablet   Refills:  0       EPINEPHrine 0.3 MG/0.3ML injection 2-pack   Commonly known as:  EPIPEN 2-MIKE   Used for:  Bee sting reaction        Dose:  0.3 mg   Inject 0.3 mLs (0.3 mg) into the muscle once as needed for anaphylaxis   Quantity:  1 each   Refills:  2       HYDROcodone-acetaminophen 5-325 MG per tablet   Commonly known as:  NORCO   Used for:  Vasectomy evaluation        Dose:  1-2 tablet   Take 1-2 tablets by mouth every 4 hours as needed Take 1-2 tablets by mouth every 4-6 hours as needed for pain   Quantity:  12 tablet   Refills:  0       ibuprofen 200 MG capsule        Take with food or milk.    2 capsules as needed.   Refills:  0       naproxen 500 MG tablet   Commonly known as:  NAPROSYN        Dose:  500 mg   Take 500 mg by mouth 4 times daily as needed   Refills:  0                Protect others around you: Learn how to safely use, store and throw away your medicines at www.disposemymeds.org.             Medication List: This is a list of all your medications and when to take them. Check marks below indicate your daily home schedule. Keep this list as a reference.      Medications           Morning Afternoon Evening Bedtime As Needed    albuterol 108 (90 BASE) MCG/ACT Inhaler   Commonly known as:  PROAIR HFA/PROVENTIL HFA/VENTOLIN HFA   Inhale 2 puffs into the lungs every 6 hours as needed for shortness of breath / dyspnea                                cyclobenzaprine 10 MG tablet   Commonly known as:  FLEXERIL    Take 1 tablet (10 mg) by mouth 3 times daily as needed for muscle spasms                                EPINEPHrine 0.3 MG/0.3ML injection 2-pack   Commonly known as:  EPIPEN 2-MIKE   Inject 0.3 mLs (0.3 mg) into the muscle once as needed for anaphylaxis                                HYDROcodone-acetaminophen 5-325 MG per tablet   Commonly known as:  NORCO   Take 1-2 tablets by mouth every 4 hours as needed Take 1-2 tablets by mouth every 4-6 hours as needed for pain                                ibuprofen 200 MG capsule   Take with food or milk.    2 capsules as needed.                                naproxen 500 MG tablet   Commonly known as:  NAPROSYN   Take 500 mg by mouth 4 times daily as needed

## 2018-03-20 NOTE — TELEPHONE ENCOUNTER
Called and updated patient that below called.Per patient this writer can talk to him.It is for SNA.Updated patient to come to clinic to sign consent to talk with them.Verbalized understanding.

## 2018-03-20 NOTE — TELEPHONE ENCOUNTER
Consent signed by patient and verbal that can release information to [employer].Called patient back to confirm procedure was today.

## 2018-03-20 NOTE — ANESTHESIA POSTPROCEDURE EVALUATION
Patient: Kale SMITH Abraham    Procedure(s):  Vasectomy - Wound Class: I-Clean    Diagnosis:elective sterilization  Diagnosis Additional Information: No value filed.    Anesthesia Type:  MAC    Note:  Anesthesia Post Evaluation    Patient location during evaluation: Phase 2  Patient participation: Able to fully participate in evaluation  Level of consciousness: awake and alert  Pain management: adequate  Airway patency: patent  Cardiovascular status: acceptable  Respiratory status: acceptable  Hydration status: acceptable  PONV: none     Anesthetic complications: None          Last vitals:  Vitals:    03/20/18 0659 03/20/18 0832 03/20/18 0845   BP: 131/78 118/85 91/72   Pulse: 67     Resp:  14    Temp: 97.6  F (36.4  C) 97.3  F (36.3  C)    SpO2: 97% 96% 96%         Electronically Signed By: MAHESH Terrazas CRNA  March 20, 2018  9:04 AM

## 2018-03-20 NOTE — OP NOTE
Preoperative diagnosis  Elective sterilization    Postoperative diagnosis  Elective sterilization    Procedure performed  Bilateral vasectomy    Surgeon  Kale Story MD    Anesthesia  MAC  8 mL lidocaine 2% local injection    Complications  None    EBL  <1 mL    Specimen  Left vas  Right vas    Indications  37 year old male agreed to undergo the above named procedure after discussion of the alternatives, risks and benefits.  Informed consent was obtained.      Procedure  The patient was brought to the procedure room and placed in supine position.  His scrotum was prepped with Hibiclens and he was draped in a sterile fashion.  A timeout was performed.    Lidocaine 2% was used to anesthetize the scrotal skin as well as perivasal sheath initially on the patient's left.  A 1 cm skin incision was created with the sharp-tip mosquito and a vas clamp utilized through this incision to grasp the vas.  The left vas was elevated to the skin surface and dissected free of its perivasal sheath.  The vas was transected and the lumen was cauterized both proximally and distally.  A 4-0 chromic suture was utilized to place the proximal vasal portion under the perivasal sheath, such that the 2 ends were divided by the perivasal sheath (fascial interposition).  This portion of the vas was then allowed to drop back into the left hemiscrotum.  The right side was treated next in the same manner as described above.  The skin incision was closed with the 4-0 chromic suture.  The patient tolerated the procedure well.    It was again reiterated to the patient that he would remain fertile for sometime and should present to the office for a post-vacectomy semen analysis to confirm sterility in 3 months.  The patient again expressed understanding.

## 2018-03-29 ENCOUNTER — TELEPHONE (OUTPATIENT)
Dept: UROLOGY | Facility: OTHER | Age: 38
End: 2018-03-29

## 2018-03-29 NOTE — TELEPHONE ENCOUNTER
Application for benefits form received from [facility].  [facility] had faxed paperwork to [employer] on 3/15/18 and [employer] notified me and gave me paperwork on 3/28/18.  Left message to call back.  Need to ask the patient some questions prior to sending form back to [facility].  Domi Collins RN......March 29, 2018...9:40 AM

## 2018-04-02 NOTE — TELEPHONE ENCOUNTER
Patient states that paperwork was already filled out the day of surgery and no further action is needed.  Domi Collins RN......April 2, 2018...10:26 AM

## 2018-08-31 ENCOUNTER — RADIANT APPOINTMENT (OUTPATIENT)
Dept: GENERAL RADIOLOGY | Facility: OTHER | Age: 38
End: 2018-08-31
Attending: NURSE PRACTITIONER
Payer: COMMERCIAL

## 2018-08-31 ENCOUNTER — OFFICE VISIT (OUTPATIENT)
Dept: FAMILY MEDICINE | Facility: OTHER | Age: 38
End: 2018-08-31
Attending: NURSE PRACTITIONER
Payer: COMMERCIAL

## 2018-08-31 VITALS
HEIGHT: 67 IN | HEART RATE: 74 BPM | BODY MASS INDEX: 40.02 KG/M2 | OXYGEN SATURATION: 96 % | WEIGHT: 255 LBS | DIASTOLIC BLOOD PRESSURE: 76 MMHG | TEMPERATURE: 97.9 F | SYSTOLIC BLOOD PRESSURE: 122 MMHG | RESPIRATION RATE: 16 BRPM

## 2018-08-31 DIAGNOSIS — M19.079 ARTHRITIS OF GREAT TOE AT METATARSOPHALANGEAL JOINT: ICD-10-CM

## 2018-08-31 DIAGNOSIS — S99.921A INJURY OF RIGHT FOOT, INITIAL ENCOUNTER: Primary | ICD-10-CM

## 2018-08-31 DIAGNOSIS — S99.921A INJURY OF RIGHT FOOT, INITIAL ENCOUNTER: ICD-10-CM

## 2018-08-31 PROCEDURE — 99213 OFFICE O/P EST LOW 20 MIN: CPT | Performed by: NURSE PRACTITIONER

## 2018-08-31 PROCEDURE — 73630 X-RAY EXAM OF FOOT: CPT | Mod: TC

## 2018-08-31 ASSESSMENT — ANXIETY QUESTIONNAIRES
2. NOT BEING ABLE TO STOP OR CONTROL WORRYING: NOT AT ALL
1. FEELING NERVOUS, ANXIOUS, OR ON EDGE: NOT AT ALL
4. TROUBLE RELAXING: NOT AT ALL
3. WORRYING TOO MUCH ABOUT DIFFERENT THINGS: NOT AT ALL
GAD7 TOTAL SCORE: 0
6. BECOMING EASILY ANNOYED OR IRRITABLE: NOT AT ALL
5. BEING SO RESTLESS THAT IT IS HARD TO SIT STILL: NOT AT ALL
7. FEELING AFRAID AS IF SOMETHING AWFUL MIGHT HAPPEN: NOT AT ALL
IF YOU CHECKED OFF ANY PROBLEMS ON THIS QUESTIONNAIRE, HOW DIFFICULT HAVE THESE PROBLEMS MADE IT FOR YOU TO DO YOUR WORK, TAKE CARE OF THINGS AT HOME, OR GET ALONG WITH OTHER PEOPLE: NOT DIFFICULT AT ALL

## 2018-08-31 ASSESSMENT — PAIN SCALES - GENERAL: PAINLEVEL: EXTREME PAIN (8)

## 2018-08-31 NOTE — PROGRESS NOTES
SUBJECTIVE:   Kale Rosario is a 38 year old male who presents to clinic today for the following health issues:      Concern - Right Foot injury   Onset: 3 weels ago    Description:   Stubbed big toe on ground while running bases playing softball.  He jammed great toe into ground.      Intensity: mild, moderate    Progression of Symptoms:  worsening    Accompanying Signs & Symptoms:  Top of foot pain     Previous history of similar problem:   no    Precipitating factors:   Worsened by: walking    Alleviating factors:  Improved by: rest    Therapies Tried and outcome: nothing         Problem list and histories reviewed & adjusted, as indicated.  Additional history: as documented    Patient Active Problem List   Diagnosis     Asthma     History of acute pancreatitis     Episodic mood disorder (H)     Hyperlipidemia with target LDL less than 100     Acquired spondylolisthesis of lumbosacral region     Lumbosacral radiculopathy at L3     Lumbar radicular pain     Morbid obesity (H)     Closed fracture of one rib of right side with routine healing     Past Surgical History:   Procedure Laterality Date     APPENDECTOMY  1995     LAPAROSCOPIC FUSION LUMBAR POSTERIOR TWO LEVELS  6/1/16    L 3-L4 fusion      LASIK  2010    Bilateral     OPTICAL TRACKING SYSTEM FUSION POSTERIOR SPINE LUMBAR Right 6/1/2016    Procedure: OPTICAL TRACKING SYSTEM FUSION SPINE POSTERIOR LUMBAR ONE LEVEL;  Surgeon: Devon Summers MD;  Location: [facility] OR     VASECTOMY Bilateral 3/20/2018    Procedure: VASECTOMY;  Vasectomy;  Surgeon: Kale Story MD;  Location:  OR       Social History   Substance Use Topics     Smoking status: Never Smoker     Smokeless tobacco: Never Used     Alcohol use Yes      Comment: OCCASIONAL     Family History   Problem Relation Age of Onset     Other - See Comments Father      aortiv anyerism     C.A.D. Father      HEART DISEASE Father      Lipids Father      Other - See Comments Maternal Grandmother 89     blood  clot         Current Outpatient Prescriptions   Medication Sig Dispense Refill     albuterol (PROAIR HFA, PROVENTIL HFA, VENTOLIN HFA) 108 (90 BASE) MCG/ACT inhaler Inhale 2 puffs into the lungs every 6 hours as needed for shortness of breath / dyspnea 1 Inhaler 1     cyclobenzaprine (FLEXERIL) 10 MG tablet Take 1 tablet (10 mg) by mouth 3 times daily as needed for muscle spasms 120 tablet 0     EPINEPHrine (EPIPEN 2-MIKE) 0.3 MG/0.3ML injection Inject 0.3 mLs (0.3 mg) into the muscle once as needed for anaphylaxis 1 each 2     HYDROcodone-acetaminophen (NORCO) 5-325 MG per tablet Take 1-2 tablets by mouth every 4 hours as needed Take 1-2 tablets by mouth every 4-6 hours as needed for pain 12 tablet 0     ibuprofen 200 MG capsule Take with food or milk.    2 capsules as needed.       naproxen (NAPROSYN) 500 MG tablet Take 500 mg by mouth 4 times daily as needed        Allergies   Allergen Reactions     Penicillins      Childhood unknown     Bees Swelling     Recent Labs   Lab Test  06/02/16   0738  05/31/16   1547  03/26/14   1500  02/11/14   1339  12/10/13   1503  11/20/13   0613   11/07/13   1215   LDL   --    --    --    --    --    --    --   156*   HDL   --    --    --    --    --    --    --   46   TRIG   --    --    --    --    --    --    --   175*   ALT   --    --    --   46  47  29   < >   --    CR  0.86  0.99  1.22  1.05  1.12  1.06   < >  1.23   GFRESTIMATED  >90  Non  GFR Calc    84  68  80  75  80   < >  67   GFRESTBLACK  >90   GFR Calc    >90   GFR Calc    82  >90  >90  >90   < >  81   POTASSIUM  4.0  4.5  4.2  4.5  3.9  3.8   < >  4.0   TSH   --    --   0.78   --    --    --    --   1.45    < > = values in this interval not displayed.      BP Readings from Last 3 Encounters:   08/31/18 122/76   03/20/18 109/68   03/13/18 130/88    Wt Readings from Last 3 Encounters:   08/31/18 255 lb (115.7 kg)   03/20/18 250 lb (113.4 kg)   03/13/18 250 lb (113.4 kg)     "                Reviewed and updated as needed this visit by clinical staff       Reviewed and updated as needed this visit by Provider         ROS:  Constitutional, HEENT, cardiovascular, pulmonary, gi and gu systems are negative, except as otherwise noted.    OBJECTIVE:     /76 (BP Location: Right arm, Patient Position: Chair, Cuff Size: Adult Large)  Pulse 74  Temp 97.9  F (36.6  C) (Tympanic)  Resp 16  Ht 5' 7\" (1.702 m)  Wt 255 lb (115.7 kg)  SpO2 96%  BMI 39.94 kg/m2  Body mass index is 39.94 kg/(m^2).  GENERAL: healthy, alert and no distress  MS: extremities normal- no gross deformities noted, right foot is tender of 1st metatarsal and PIP joint of right foot.  No redness noted, mild swelling ntoed.    PSYCH: mentation appears normal, affect normal/bright        ASSESSMENT/PLAN:       1. Injury of right foot, initial encounter  - XR FOOT RT G/E 3 VW (Clinic Performed); Future  - reviewed with Gianfranco Castellon, no fracture noted.  Degenerative changes noted.     2. Arthritis of great toe at metatarsophalangeal joint  Ice, heat, rest  NSAIDS per package directions      FUTURE APPOINTMENTS:       - Follow-up visit if no improvement will refer to ortho for possible injection.      Caridad Aviles NP  [facility]  "

## 2018-08-31 NOTE — MR AVS SNAPSHOT
"              After Visit Summary   8/31/2018    Kale Rosario    MRN: [id number]           Patient Information     Date Of Birth          1980        Visit Information        Provider Department      8/31/2018 11:30 AM Caridad Aviles NP [facility]        Today's Diagnoses     Injury of right foot, initial encounter    -  1    Arthritis of great toe at metatarsophalangeal joint           Follow-ups after your visit        Follow-up notes from your care team     Return if symptoms worsen or fail to improve.      Who to contact     If you have questions or need follow up information about today's clinic visit or your schedule please contact [facility] directly at [phone].  Normal or non-critical lab and imaging results will be communicated to you by MyChart, letter or phone within 4 business days after the clinic has received the results. If you do not hear from us within 7 days, please contact the clinic through MyChart or phone. If you have a critical or abnormal lab result, we will notify you by phone as soon as possible.  Submit refill requests through [employer] or call your pharmacy and they will forward the refill request to us. Please allow 3 business days for your refill to be completed.          Additional Information About Your Visit        Care EveryWhere ID     This is your Care EveryWhere ID. This could be used by other organizations to access your [place] medical records  [id number]        Your Vitals Were     Pulse Temperature Respirations Height Pulse Oximetry BMI (Body Mass Index)    74 97.9  F (36.6  C) (Tympanic) 16 5' 7\" (1.702 m) 96% 39.94 kg/m2       Blood Pressure from Last 3 Encounters:   08/31/18 122/76   03/20/18 109/68   03/13/18 130/88    Weight from Last 3 Encounters:   08/31/18 255 lb (115.7 kg)   03/20/18 250 lb (113.4 kg)   03/13/18 250 lb (113.4 kg)               Primary Care Provider Office Phone # Fax #    Gabriela Morales MD " [phone]       [address] [facility] [address]        Equal Access to Services     GILLIAN PITTMAN : Hadii aad ku hadjannettesandip Alfonsoali, wateresada luqappleha, qagitata kaalannada lilianemelo, janina susanain hayaabaldo mominjv ahirston reza quintanilla. So [facility] [phone].    ATENCIÓN: Si habla español, tiene a young disposición servicios gratuitos de asistencia lingüística. Veronique al [phone].    We comply with applicable federal civil rights laws and [place] laws. We do not discriminate on the basis of race, color, national origin, age, disability, sex, sexual orientation, or gender identity.            Thank you!     Thank you for choosing [facility]  for your care. Our goal is always to provide you with excellent care. Hearing back from our patients is one way we can continue to improve our services. Please take a few minutes to complete the written survey that you may receive in the mail after your visit with us. Thank you!             Your Updated Medication List - Protect others around you: Learn how to safely use, store and throw away your medicines at www.disposemymeds.org.          This list is accurate as of 8/31/18 11:59 PM.  Always use your most recent med list.                   Brand Name Dispense Instructions for use Diagnosis    albuterol 108 (90 Base) MCG/ACT inhaler    PROAIR HFA/PROVENTIL HFA/VENTOLIN HFA    1 Inhaler    Inhale 2 puffs into the lungs every 6 hours as needed for shortness of breath / dyspnea    SOB (shortness of breath)       cyclobenzaprine 10 MG tablet    FLEXERIL    120 tablet    Take 1 tablet (10 mg) by mouth 3 times daily as needed for muscle spasms    Muscle spasm       EPINEPHrine 0.3 MG/0.3ML injection 2-pack    EPIPEN 2-MIKE    1 each    Inject 0.3 mLs (0.3 mg) into the muscle once as needed for anaphylaxis    Bee sting reaction       HYDROcodone-acetaminophen 5-325 MG per tablet    NORCO    12 tablet    Take 1-2 tablets by mouth every 4 hours as needed Take  1-2 tablets by mouth every 4-6 hours as needed for pain    Vasectomy evaluation       ibuprofen 200 MG capsule      Take with food or milk.    2 capsules as needed.        naproxen 500 MG tablet    NAPROSYN     Take 500 mg by mouth 4 times daily as needed

## 2018-08-31 NOTE — NURSING NOTE
"Chief Complaint   Patient presents with     Foot Injury     rt       Initial /76 (BP Location: Right arm, Patient Position: Chair, Cuff Size: Adult Large)  Pulse 74  Temp 97.9  F (36.6  C) (Tympanic)  Resp 16  Ht 5' 7\" (1.702 m)  Wt 255 lb (115.7 kg)  SpO2 96%  BMI 39.94 kg/m2 Estimated body mass index is 39.94 kg/(m^2) as calculated from the following:    Height as of this encounter: 5' 7\" (1.702 m).    Weight as of this encounter: 255 lb (115.7 kg).  Medication Reconciliation: complete    Pamela M. Lechevalier, LPN    "

## 2018-09-01 ASSESSMENT — PATIENT HEALTH QUESTIONNAIRE - PHQ9: SUM OF ALL RESPONSES TO PHQ QUESTIONS 1-9: 0

## 2018-09-01 ASSESSMENT — ANXIETY QUESTIONNAIRES: GAD7 TOTAL SCORE: 0

## 2018-11-02 ENCOUNTER — RADIANT APPOINTMENT (OUTPATIENT)
Dept: GENERAL RADIOLOGY | Facility: OTHER | Age: 38
End: 2018-11-02
Attending: SPECIALIST
Payer: COMMERCIAL

## 2018-11-02 ENCOUNTER — TRANSFERRED RECORDS (OUTPATIENT)
Dept: HEALTH INFORMATION MANAGEMENT | Facility: CLINIC | Age: 38
End: 2018-11-02

## 2018-11-02 DIAGNOSIS — M79.671 FOOT PAIN, RIGHT: ICD-10-CM

## 2018-11-02 PROCEDURE — 73620 X-RAY EXAM OF FOOT: CPT | Mod: TC

## 2018-11-29 ENCOUNTER — TELEPHONE (OUTPATIENT)
Dept: FAMILY MEDICINE | Facility: OTHER | Age: 38
End: 2018-11-29

## 2018-12-07 ENCOUNTER — TRANSFERRED RECORDS (OUTPATIENT)
Dept: HEALTH INFORMATION MANAGEMENT | Facility: CLINIC | Age: 38
End: 2018-12-07

## 2019-02-15 ENCOUNTER — TRANSFERRED RECORDS (OUTPATIENT)
Dept: HEALTH INFORMATION MANAGEMENT | Facility: CLINIC | Age: 39
End: 2019-02-15

## 2019-03-28 ENCOUNTER — TRANSFERRED RECORDS (OUTPATIENT)
Dept: HEALTH INFORMATION MANAGEMENT | Facility: CLINIC | Age: 39
End: 2019-03-28

## 2019-05-13 NOTE — PROGRESS NOTES
"Occupational Visit     Subjective:  Kale Rosario, 38 year old, male is seen 05/17/19 for evaluation of right knee pain.  The date of injury is 04/11/19.  The patient is employed at [employer].    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  He pushed himself up using his knee, and he felt a \"pop\" and developed pain in the knee.  He has continued to work, but states the pain is not improving.  He has been using ice and ibuprofen without improvement.  He needs to wear a brace to provide stability.  He states his knee is still swollen and painful.  He states if he kneels, he can't get up, so he can't kneel all the way down.  He does have a history of problems with this knee, so he is concerned about injury.      Allergies   Allergen Reactions     Penicillins      Childhood unknown     Bees Swelling         Review of Systems:  Constitutional, HEENT, cardiovascular, pulmonary, gi and gu systems are negative, except as otherwise noted.      OBJECTIVE:  Vitals: B/P: Data Unavailable, T: Data Unavailable, P: Data Unavailable, R: Data Unavailable      Exam:  GENERAL:: healthy, alert and no distress  MS: extremities- no gross deformities noted, no edema  Knee Exam: Inspection: AP/lateral alignment normal, small effusion  Tender: prepatellar bursa  PSYCH: Alert and oriented times 3; speech- coherent , normal rate and volume; able to articulate logical thoughts, able to abstract reason, no tangential thoughts, no hallucinations or delusions, affect- normal      Labs: none indicated      ASSESSMENT/PLAN:  1. Acute pain of right knee  With history of knee issues and ongoing symptoms, I do think a MRI is needed to rule out internal derangement.  Will then refer back to Orthopedics for evaluation.  For now, will place on light duty.  Follow-up here in about one month, sooner as directed.  - MR Knee Right w/o Contrast; Future  - ORTHOPEDICS ADULT REFERRAL        Gabriela Morales MD    "

## 2019-05-17 ENCOUNTER — OFFICE VISIT (OUTPATIENT)
Dept: FAMILY MEDICINE | Facility: OTHER | Age: 39
End: 2019-05-17
Attending: FAMILY MEDICINE
Payer: OTHER MISCELLANEOUS

## 2019-05-17 VITALS
RESPIRATION RATE: 16 BRPM | SYSTOLIC BLOOD PRESSURE: 122 MMHG | HEIGHT: 67 IN | OXYGEN SATURATION: 96 % | BODY MASS INDEX: 41.28 KG/M2 | DIASTOLIC BLOOD PRESSURE: 76 MMHG | WEIGHT: 263 LBS | TEMPERATURE: 98.9 F | HEART RATE: 97 BPM

## 2019-05-17 DIAGNOSIS — M25.561 ACUTE PAIN OF RIGHT KNEE: Primary | ICD-10-CM

## 2019-05-17 PROCEDURE — 99213 OFFICE O/P EST LOW 20 MIN: CPT | Performed by: FAMILY MEDICINE

## 2019-05-17 ASSESSMENT — MIFFLIN-ST. JEOR: SCORE: 2061.59

## 2019-05-17 ASSESSMENT — PAIN SCALES - GENERAL: PAINLEVEL: MODERATE PAIN (4)

## 2019-05-17 NOTE — NURSING NOTE
"Chief Complaint   Patient presents with     Work Comp       Initial /76 (BP Location: Right arm, Patient Position: Sitting, Cuff Size: Adult Regular)   Pulse 97   Temp 98.9  F (37.2  C) (Tympanic)   Resp 16   Ht 1.702 m (5' 7\")   Wt 119.3 kg (263 lb)   SpO2 96%   BMI 41.19 kg/m   Estimated body mass index is 41.19 kg/m  as calculated from the following:    Height as of this encounter: 1.702 m (5' 7\").    Weight as of this encounter: 119.3 kg (263 lb).  Medication Reconciliation: complete    Heena Robb MA  "

## 2019-05-17 NOTE — LETTER
[facility]  [address]  [place] [address]  Phone: [phone]    May 17, 2019        Kale SMITH Abraham  [address] [place] [address]          To whom it may concern:    RE: Kale Rosario    Patient may return to work 5/20/19 with the following:  Light duty-unable to lift more than 10 pounds; no bending or kneeling    Work restriction to remain in place for 4 weeks.  Orthopedics consultation ordered.      Please contact me for questions or concerns.      Sincerely,        Gabriela Morales MD

## 2019-05-31 ENCOUNTER — HOSPITAL ENCOUNTER (OUTPATIENT)
Dept: MRI IMAGING | Facility: HOSPITAL | Age: 39
Discharge: HOME | End: 2019-05-31
Attending: FAMILY MEDICINE | Admitting: FAMILY MEDICINE
Payer: OTHER MISCELLANEOUS

## 2019-05-31 DIAGNOSIS — M25.561 ACUTE PAIN OF RIGHT KNEE: ICD-10-CM

## 2019-05-31 PROCEDURE — 73721 MRI JNT OF LWR EXTRE W/O DYE: CPT | Mod: TC,RT

## 2019-06-04 DIAGNOSIS — M76.51 PATELLAR TENDONITIS OF RIGHT KNEE: Primary | ICD-10-CM

## 2019-06-07 ENCOUNTER — TRANSFERRED RECORDS (OUTPATIENT)
Dept: HEALTH INFORMATION MANAGEMENT | Facility: CLINIC | Age: 39
End: 2019-06-07

## 2019-06-19 ENCOUNTER — TELEPHONE (OUTPATIENT)
Dept: FAMILY MEDICINE | Facility: OTHER | Age: 39
End: 2019-06-19

## 2019-06-19 NOTE — TELEPHONE ENCOUNTER
Isadora from work comp is wondering if PT is needed at this time since patient recently had an injection while seeing the Ortho provider.  Also, is the PT being ordered by [facility] or [facility]?    Please advise.

## 2019-07-19 ENCOUNTER — TRANSFERRED RECORDS (OUTPATIENT)
Dept: HEALTH INFORMATION MANAGEMENT | Facility: CLINIC | Age: 39
End: 2019-07-19

## 2020-03-17 ENCOUNTER — OFFICE VISIT (OUTPATIENT)
Dept: FAMILY MEDICINE | Facility: OTHER | Age: 40
End: 2020-03-17
Attending: FAMILY MEDICINE
Payer: COMMERCIAL

## 2020-03-17 VITALS
SYSTOLIC BLOOD PRESSURE: 132 MMHG | HEART RATE: 75 BPM | DIASTOLIC BLOOD PRESSURE: 84 MMHG | OXYGEN SATURATION: 97 % | WEIGHT: 271.7 LBS | BODY MASS INDEX: 42.55 KG/M2 | TEMPERATURE: 96.2 F

## 2020-03-17 DIAGNOSIS — Z98.52 S/P VASECTOMY: ICD-10-CM

## 2020-03-17 DIAGNOSIS — Z20.2 POSSIBLE EXPOSURE TO STD: Primary | ICD-10-CM

## 2020-03-17 PROCEDURE — 86695 HERPES SIMPLEX TYPE 1 TEST: CPT | Performed by: FAMILY MEDICINE

## 2020-03-17 PROCEDURE — 36415 COLL VENOUS BLD VENIPUNCTURE: CPT | Performed by: FAMILY MEDICINE

## 2020-03-17 PROCEDURE — 86696 HERPES SIMPLEX TYPE 2 TEST: CPT | Performed by: FAMILY MEDICINE

## 2020-03-17 PROCEDURE — 99213 OFFICE O/P EST LOW 20 MIN: CPT | Performed by: FAMILY MEDICINE

## 2020-03-17 ASSESSMENT — PAIN SCALES - GENERAL: PAINLEVEL: NO PAIN (0)

## 2020-03-17 NOTE — PROGRESS NOTES
Subjective     Kale Rosario is a 39 year old male who presents to clinic today for the following health issues:    HPI   STD testing      Duration: 2 years    Description (location/character/radiation): possible Herpes exposure, 2 years ago    Intensity:  Not applicable    Accompanying signs and symptoms: none    History (similar episodes/previous evaluation): Did have an STD in the past, was treated    Precipitating or alleviating factors: None    Therapies tried and outcome: None    Patient denies any active or previous lesions       Needs sperm testing      Duration: 3 years    Description (location/character/radiation): Vasectomy    Intensity:  mild    Accompanying signs and symptoms: Needs motility testing, did not have one after Vasectomy    History (similar episodes/previous evaluation): None    Precipitating or alleviating factors: None    Therapies tried and outcome: None       Patient Active Problem List   Diagnosis     Asthma     History of acute pancreatitis     Episodic mood disorder (H)     Hyperlipidemia with target LDL less than 100     Acquired spondylolisthesis of lumbosacral region     Lumbosacral radiculopathy at L3     Lumbar radicular pain     Morbid obesity (H)     Closed fracture of one rib of right side with routine healing     Past Surgical History:   Procedure Laterality Date     APPENDECTOMY  1995     LAPAROSCOPIC FUSION LUMBAR POSTERIOR TWO LEVELS  6/1/16    L 3-L4 fusion      LASIK  2010    Bilateral     OPTICAL TRACKING SYSTEM FUSION POSTERIOR SPINE LUMBAR Right 6/1/2016    Procedure: OPTICAL TRACKING SYSTEM FUSION SPINE POSTERIOR LUMBAR ONE LEVEL;  Surgeon: Devon Summers MD;  Location: [facility] OR     VASECTOMY Bilateral 3/20/2018    Procedure: VASECTOMY;  Vasectomy;  Surgeon: Kale Story MD;  Location:  OR       Social History     Tobacco Use     Smoking status: Never Smoker     Smokeless tobacco: Never Used   Substance Use Topics     Alcohol use: Yes     Comment: OCCASIONAL      Family History   Problem Relation Age of Onset     Other - See Comments Father         aortiv anyerism     C.A.D. Father      Heart Disease Father      Lipids Father      Other - See Comments Maternal Grandmother 89        blood clot         Current Outpatient Medications   Medication Sig Dispense Refill     albuterol (PROAIR HFA, PROVENTIL HFA, VENTOLIN HFA) 108 (90 BASE) MCG/ACT inhaler Inhale 2 puffs into the lungs every 6 hours as needed for shortness of breath / dyspnea 1 Inhaler 1     cyclobenzaprine (FLEXERIL) 10 MG tablet Take 1 tablet (10 mg) by mouth 3 times daily as needed for muscle spasms 120 tablet 0     EPINEPHrine (EPIPEN 2-MIKE) 0.3 MG/0.3ML injection Inject 0.3 mLs (0.3 mg) into the muscle once as needed for anaphylaxis 1 each 2     HYDROcodone-acetaminophen (NORCO) 5-325 MG per tablet Take 1-2 tablets by mouth every 4 hours as needed Take 1-2 tablets by mouth every 4-6 hours as needed for pain 12 tablet 0     ibuprofen 200 MG capsule Take with food or milk.    2 capsules as needed.       naproxen (NAPROSYN) 500 MG tablet Take 500 mg by mouth 4 times daily as needed        Allergies   Allergen Reactions     Penicillins      Childhood unknown     Bees Swelling       Reviewed and updated as needed this visit by Provider  Tobacco  Allergies  Meds  Problems  Med Hx  Surg Hx  Fam Hx         Review of Systems   ROS COMP: Constitutional, HEENT, cardiovascular, pulmonary, gi and gu systems are negative, except as otherwise noted.      Objective    /84   Pulse 75   Temp 96.2  F (35.7  C)   Wt 123.2 kg (271 lb 11.2 oz)   SpO2 97%   BMI 42.55 kg/m    Body mass index is 42.55 kg/m .  Physical Exam   GENERAL: healthy, alert and no distress  PSYCH: mentation appears normal, affect normal/bright    Diagnostic Test Results:  See orders        Assessment & Plan     1. Possible exposure to STD  We discussed possible false positive rate in asymptomatic patients, patient will feel better if he does  "have a negative result and will try not to worry too much if results are positive (will use protection and will monitor for symptoms).  Follow-up with results when available.  - HSV 1 and 2 DNA by PCR    2. S/P vasectomy  Testing ordered, patient knows he will need to complete this in [place]  - Semen Analysis Post Vasectomy; Future     BMI:   Estimated body mass index is 42.55 kg/m  as calculated from the following:    Height as of 5/17/19: 1.702 m (5' 7\").    Weight as of this encounter: 123.2 kg (271 lb 11.2 oz).       Return if symptoms worsen or fail to improve.    Gabriela Morales MD  [facility]    "

## 2020-03-17 NOTE — NURSING NOTE
"Chief Complaint   Patient presents with     Personal       Initial /84   Pulse 75   Temp 96.2  F (35.7  C)   Wt 123.2 kg (271 lb 11.2 oz)   SpO2 97%   BMI 42.55 kg/m   Estimated body mass index is 42.55 kg/m  as calculated from the following:    Height as of 5/17/19: 1.702 m (5' 7\").    Weight as of this encounter: 123.2 kg (271 lb 11.2 oz).  Medication Reconciliation: complete  Tish Gandhi LPN    "

## 2020-03-18 LAB
HSV1 DNA SPEC QL NAA+PROBE: NORMAL
HSV1 IGG SERPL QL IA: 2.4 AI (ref 0–0.8)
HSV2 DNA SPEC QL NAA+PROBE: NORMAL
HSV2 IGG SERPL QL IA: <0.2 AI (ref 0–0.8)
SPECIMEN SOURCE: NORMAL

## 2020-07-16 ENCOUNTER — TELEPHONE (OUTPATIENT)
Dept: FAMILY MEDICINE | Facility: OTHER | Age: 40
End: 2020-07-16

## 2020-07-16 NOTE — TELEPHONE ENCOUNTER
Would you be willing to write a letter stating pt does not need to wear a mask at work due to his asthma?  Pt states it is harder to breath and is willing to except the consequences.

## 2020-07-16 NOTE — TELEPHONE ENCOUNTER
Unfortunately, I am not willing to write this letter.  Only people with severe COPD need exception letters for masks.  He may need to try different masks in order to find one that works.

## 2020-11-06 ENCOUNTER — TELEPHONE (OUTPATIENT)
Dept: FAMILY MEDICINE | Facility: OTHER | Age: 40
End: 2020-11-06

## 2020-11-06 NOTE — LETTER
My Asthma Action Plan    Name: Kale Rosario   YOB: 1980  Date: 11/6/2020   My doctor: Gabriela Morales MD   My clinic: [facility]        My Rescue Medicine:   Albuterol inhaler (Proair/Ventolin/Proventil HFA)  2-4 puffs EVERY 4 HOURS as needed. Use a spacer if recommended by your provider.   My Asthma Severity:   Intermittent / Exercise Induced  Know your asthma triggers: upper respiratory infections             GREEN ZONE   Good Control    I feel good    No cough or wheeze    Can work, sleep and play without asthma symptoms       Take your asthma control medicine every day.     1. If exercise triggers your asthma, take your rescue medication    15 minutes before exercise or sports, and    During exercise if you have asthma symptoms  2. Spacer to use with inhaler: If you have a spacer, make sure to use it with your inhaler             YELLOW ZONE Getting Worse  I have ANY of these:    I do not feel good    Cough or wheeze    Chest feels tight    Wake up at night   1. Keep taking your Green Zone medications  2. Start taking your rescue medicine:    every 20 minutes for up to 1 hour. Then every 4 hours for 24-48 hours.  3. If you stay in the Yellow Zone for more than 12-24 hours, contact your doctor.  4. If you do not return to the Green Zone in 12-24 hours or you get worse, start taking your oral steroid medicine if prescribed by your provider.           RED ZONE Medical Alert - Get Help  I have ANY of these:    I feel awful    Medicine is not helping    Breathing getting harder    Trouble walking or talking    Nose opens wide to breathe       1. Take your rescue medicine NOW  2. If your provider has prescribed an oral steroid medicine, start taking it NOW  3. Call your doctor NOW  4. If you are still in the Red Zone after 20 minutes and you have not reached your doctor:    Take your rescue medicine again and    Call 911 or go to the emergency room right away    See your regular  doctor within 2 weeks of an Emergency Room or Urgent Care visit for follow-up treatment.          Annual Reminders:  Meet with Asthma Educator,  Flu Shot in the Fall, consider Pneumonia Vaccination for patients with asthma (aged 19 and older).    Pharmacy: [facility] [id number] [place] [address]    Electronically signed by Gabriela Morales MD   Date: 11/06/20                    Asthma Triggers  How To Control Things That Make Your Asthma Worse    Triggers are things that make your asthma worse.  Look at the list below to help you find your triggers and   what you can do about them. You can help prevent asthma flare-ups by staying away from your triggers.      Trigger                                                          What you can do   Cigarette Smoke  Tobacco smoke can make asthma worse. Do not allow smoking in your home, car or around you.  Be sure no one smokes at a child s day care or school.  If you smoke, ask your health care provider for ways to help you quit.  Ask family members to quit too.  Ask your health care provider for a referral to Quit Plan to help you quit smoking, or call 0-217-813-PLAN.     Colds, Flu, Bronchitis  These are common triggers of asthma. Wash your hands often.  Don t touch your eyes, nose or mouth.  Get a flu shot every year.     Dust Mites  These are tiny bugs that live in cloth or carpet. They are too small to see. Wash sheets and blankets in hot water every week.   Encase pillows and mattress in dust mite proof covers.  Avoid having carpet if you can. If you have carpet, vacuum weekly.   Use a dust mask and HEPA vacuum.   Pollen and Outdoor Mold  Some people are allergic to trees, grass, or weed pollen, or molds. Try to keep your windows closed.  Limit time out doors when pollen count is high.   Ask you health care provider about taking medicine during allergy season.     Animal Dander  Some people are allergic to skin flakes, urine or saliva from pets with  fur or feathers. Keep pets with fur or feathers out of your home.    If you can t keep the pet outdoors, then keep the pet out of your bedroom.  Keep the bedroom door closed.  Keep pets off cloth furniture and away from stuffed toys.     Mice, Rats, and Cockroaches  Some people are allergic to the waste from these pests.   Cover food and garbage.  Clean up spills and food crumbs.  Store grease in the refrigerator.   Keep food out of the bedroom.   Indoor Mold  This can be a trigger if your home has high moisture. Fix leaking faucets, pipes, or other sources of water.   Clean moldy surfaces.  Dehumidify basement if it is damp and smelly.   Smoke, Strong Odors, and Sprays  These can reduce air quality. Stay away from strong odors and sprays, such as perfume, powder, hair spray, paints, smoke incense, paint, cleaning products, candles and new carpet.   Exercise or Sports  Some people with asthma have this trigger. Be active!  Ask your doctor about taking medicine before sports or exercise to prevent symptoms.    Warm up for 5-10 minutes before and after sports or exercise.     Other Triggers of Asthma  Cold air:  Cover your nose and mouth with a scarf.  Sometimes laughing or crying can be a trigger.  Some medicines and food can trigger asthma.

## 2020-11-10 NOTE — PROGRESS NOTES
Subjective     Kale Rosario is a 40 year old male who presents to clinic today for the following health issues:    HPI         Hyperlipidemia Follow-Up      Are you regularly taking any medication or supplement to lower your cholesterol?   No    Are you having muscle aches or other side effects that you think could be caused by your cholesterol lowering medication?  No    Asthma Follow-Up    Was ACT completed today?    Yes    ACT Total Scores 11/11/2020   ACT TOTAL SCORE (Goal Greater than or Equal to 20) 10   In the past 12 months, how many times did you visit the emergency room for your asthma without being admitted to the hospital? 0   In the past 12 months, how many times were you hospitalized overnight because of your asthma? 0       How many days per week do you miss taking your asthma controller medication?  I do not have an asthma controller medication    Please describe any recent triggers for your asthma: exercise or sports    Have you had any Emergency Room Visits, Urgent Care Visits, or Hospital Admissions since your last office visit?  No      How many servings of fruits and vegetables do you eat daily?  2-3    On average, how many sweetened beverages do you drink each day (Examples: soda, juice, sweet tea, etc.  Do NOT count diet or artificially sweetened beverages)?   1    How many days per week do you exercise enough to make your heart beat faster? 3 or less    How many minutes a day do you exercise enough to make your heart beat faster? 20 - 29    How many days per week do you miss taking your medication? 0    Patient states his breathing is affected by wearing a mask at work.  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      Patient Active Problem List   Diagnosis     Asthma     History of acute pancreatitis     Episodic mood disorder (H)     Hyperlipidemia with target LDL less than 100     Acquired spondylolisthesis of lumbosacral region     Lumbosacral radiculopathy at L3     Lumbar radicular pain     Morbid obesity (H)     Closed fracture of one rib of right side with routine healing     Past Surgical History:   Procedure Laterality Date     APPENDECTOMY  1995     LAPAROSCOPIC FUSION LUMBAR POSTERIOR TWO LEVELS  6/1/16    L 3-L4 fusion      LASIK  2010    Bilateral     OPTICAL TRACKING SYSTEM FUSION POSTERIOR SPINE LUMBAR Right 6/1/2016    Procedure: OPTICAL TRACKING SYSTEM FUSION SPINE POSTERIOR LUMBAR ONE LEVEL;  Surgeon: Devon Summers MD;  Location: [facility] OR     VASECTOMY Bilateral 3/20/2018    Procedure: VASECTOMY;  Vasectomy;  Surgeon: Kale Story MD;  Location:  OR       Social History     Tobacco Use     Smoking status: Never Smoker     Smokeless tobacco: Never Used   Substance Use Topics     Alcohol use: Yes     Comment: OCCASIONAL     Family History   Problem Relation Age of Onset     Other - See Comments Father         aortiv anyerism     C.A.D. Father      Heart Disease Father      Lipids Father      Other - See Comments Maternal Grandmother 89        blood clot           Current Outpatient Medications   Medication Sig Dispense Refill     albuterol (PROAIR HFA/PROVENTIL HFA/VENTOLIN HFA) 108 (90 Base) MCG/ACT inhaler Inhale 2 puffs into the lungs every 6 hours as needed for shortness of breath / dyspnea 1 Inhaler 1     cyclobenzaprine (FLEXERIL) 10 MG tablet Take 1 tablet (10 mg) by mouth 3 times daily as needed for muscle spasms 120 tablet 0     EPINEPHrine (EPIPEN 2-MIKE) 0.3 MG/0.3ML injection Inject 0.3 mLs (0.3 mg) into the muscle once as needed for anaphylaxis 1 each 2     HYDROcodone-acetaminophen (NORCO) 5-325 MG per tablet Take 1-2 tablets by mouth every 4 hours as needed Take 1-2 tablets by  "mouth every 4-6 hours as needed for pain 12 tablet 0     ibuprofen 200 MG capsule Take with food or milk.    2 capsules as needed.       naproxen (NAPROSYN) 500 MG tablet Take 500 mg by mouth 4 times daily as needed        Allergies   Allergen Reactions     Penicillins      Childhood unknown     Bees Swelling       Family History, Social History, Tobacco Use are all reviewed and updated      Review of Systems   Constitutional, HEENT, cardiovascular, pulmonary, gi and gu systems are negative, except as otherwise noted.      Objective    BP (!) 140/100 (BP Location: Right arm, Patient Position: Chair, Cuff Size: Adult Large)   Pulse 69   Resp 16   Ht 1.702 m (5' 7\")   Wt 126.1 kg (278 lb)   SpO2 97%   BMI 43.54 kg/m    Body mass index is 43.54 kg/m .  Physical Exam   GENERAL: healthy, alert and no distress  PSYCH: mentation appears normal, affect normal/bright          Assessment & Plan     1. Hyperlipidemia with target LDL less than 100  Labs updated  - Lipid Profile (Chol, Trig, HDL, LDL calc)    2. Mild intermittent asthma without complication  Inhaler refilled, did write note to be able to remove mask while with this one certain individual, otherwise, he must wear a mask while working.  - albuterol (PROAIR HFA/PROVENTIL HFA/VENTOLIN HFA) 108 (90 Base) MCG/ACT inhaler; Inhale 2 puffs into the lungs every 6 hours as needed for shortness of breath / dyspnea  Dispense: 1 Inhaler; Refill: 1    3. Anemia, unspecified type  Will recheck levels.  - CBC with platelets      BMI:   Estimated body mass index is 43.54 kg/m  as calculated from the following:    Height as of this encounter: 1.702 m (5' 7\").    Weight as of this encounter: 126.1 kg (278 lb).       FUTURE APPOINTMENTS:       - Follow-up visit in 6 months    No follow-ups on file.    Gabriela Morales MD  [facility]    "

## 2020-11-11 ENCOUNTER — OFFICE VISIT (OUTPATIENT)
Dept: FAMILY MEDICINE | Facility: OTHER | Age: 40
End: 2020-11-11
Attending: FAMILY MEDICINE
Payer: COMMERCIAL

## 2020-11-11 VITALS
HEART RATE: 69 BPM | BODY MASS INDEX: 43.63 KG/M2 | OXYGEN SATURATION: 97 % | SYSTOLIC BLOOD PRESSURE: 140 MMHG | HEIGHT: 67 IN | DIASTOLIC BLOOD PRESSURE: 100 MMHG | RESPIRATION RATE: 16 BRPM | WEIGHT: 278 LBS

## 2020-11-11 DIAGNOSIS — D64.9 ANEMIA, UNSPECIFIED TYPE: ICD-10-CM

## 2020-11-11 DIAGNOSIS — J45.20 MILD INTERMITTENT ASTHMA WITHOUT COMPLICATION: ICD-10-CM

## 2020-11-11 DIAGNOSIS — E78.5 HYPERLIPIDEMIA WITH TARGET LDL LESS THAN 100: Primary | ICD-10-CM

## 2020-11-11 LAB
CHOLEST SERPL-MCNC: 243 MG/DL
ERYTHROCYTE [DISTWIDTH] IN BLOOD BY AUTOMATED COUNT: 14.3 % (ref 10–15)
HCT VFR BLD AUTO: 42.6 % (ref 40–53)
HDLC SERPL-MCNC: 54 MG/DL
HGB BLD-MCNC: 14.2 G/DL (ref 13.3–17.7)
LDLC SERPL CALC-MCNC: 169 MG/DL
MCH RBC QN AUTO: 28.8 PG (ref 26.5–33)
MCHC RBC AUTO-ENTMCNC: 33.3 G/DL (ref 31.5–36.5)
MCV RBC AUTO: 86 FL (ref 78–100)
NONHDLC SERPL-MCNC: 189 MG/DL
PLATELET # BLD AUTO: 315 10E9/L (ref 150–450)
RBC # BLD AUTO: 4.93 10E12/L (ref 4.4–5.9)
TRIGL SERPL-MCNC: 99 MG/DL
WBC # BLD AUTO: 9.1 10E9/L (ref 4–11)

## 2020-11-11 PROCEDURE — 36415 COLL VENOUS BLD VENIPUNCTURE: CPT | Performed by: FAMILY MEDICINE

## 2020-11-11 PROCEDURE — 99214 OFFICE O/P EST MOD 30 MIN: CPT | Performed by: FAMILY MEDICINE

## 2020-11-11 PROCEDURE — 85027 COMPLETE CBC AUTOMATED: CPT | Performed by: FAMILY MEDICINE

## 2020-11-11 PROCEDURE — 80061 LIPID PANEL: CPT | Performed by: FAMILY MEDICINE

## 2020-11-11 RX ORDER — ALBUTEROL SULFATE 90 UG/1
2 AEROSOL, METERED RESPIRATORY (INHALATION) EVERY 6 HOURS PRN
Qty: 1 INHALER | Refills: 1 | Status: SHIPPED | OUTPATIENT
Start: 2020-11-11

## 2020-11-11 ASSESSMENT — ANXIETY QUESTIONNAIRES
4. TROUBLE RELAXING: NOT AT ALL
GAD7 TOTAL SCORE: 0
3. WORRYING TOO MUCH ABOUT DIFFERENT THINGS: NOT AT ALL
5. BEING SO RESTLESS THAT IT IS HARD TO SIT STILL: NOT AT ALL
7. FEELING AFRAID AS IF SOMETHING AWFUL MIGHT HAPPEN: NOT AT ALL
IF YOU CHECKED OFF ANY PROBLEMS ON THIS QUESTIONNAIRE, HOW DIFFICULT HAVE THESE PROBLEMS MADE IT FOR YOU TO DO YOUR WORK, TAKE CARE OF THINGS AT HOME, OR GET ALONG WITH OTHER PEOPLE: NOT DIFFICULT AT ALL
2. NOT BEING ABLE TO STOP OR CONTROL WORRYING: NOT AT ALL
6. BECOMING EASILY ANNOYED OR IRRITABLE: NOT AT ALL
1. FEELING NERVOUS, ANXIOUS, OR ON EDGE: NOT AT ALL

## 2020-11-11 ASSESSMENT — ASTHMA QUESTIONNAIRES
QUESTION_3 LAST FOUR WEEKS HOW OFTEN DID YOUR ASTHMA SYMPTOMS (WHEEZING, COUGHING, SHORTNESS OF BREATH, CHEST TIGHTNESS OR PAIN) WAKE YOU UP AT NIGHT OR EARLIER THAN USUAL IN THE MORNING: TWO OR THREE NIGHTS A WEEK
QUESTION_2 LAST FOUR WEEKS HOW OFTEN HAVE YOU HAD SHORTNESS OF BREATH: MORE THAN ONCE A DAY
QUESTION_5 LAST FOUR WEEKS HOW WOULD YOU RATE YOUR ASTHMA CONTROL: SOMEWHAT CONTROLLED
ACT_TOTALSCORE: 10
QUESTION_1 LAST FOUR WEEKS HOW MUCH OF THE TIME DID YOUR ASTHMA KEEP YOU FROM GETTING AS MUCH DONE AT WORK, SCHOOL OR AT HOME: MOST OF THE TIME
QUESTION_4 LAST FOUR WEEKS HOW OFTEN HAVE YOU USED YOUR RESCUE INHALER OR NEBULIZER MEDICATION (SUCH AS ALBUTEROL): ONE OR TWO TIMES PER DAY

## 2020-11-11 ASSESSMENT — PAIN SCALES - GENERAL: PAINLEVEL: MILD PAIN (2)

## 2020-11-11 ASSESSMENT — PATIENT HEALTH QUESTIONNAIRE - PHQ9: SUM OF ALL RESPONSES TO PHQ QUESTIONS 1-9: 0

## 2020-11-11 ASSESSMENT — MIFFLIN-ST. JEOR: SCORE: 2119.63

## 2020-11-11 NOTE — LETTER
[facility]  [address]  [place] [address]  Phone: [phone]    November 11, 2020        Kale SMITH Abraham  [address]   [place] [address]          To whom it may concern:    RE: Kale Rosario    Patient has been encouraged to wear a mask at work.  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    Please contact me for questions or concerns.      Sincerely,        Gabriela Morales MD

## 2020-11-11 NOTE — NURSING NOTE
"Chief Complaint   Patient presents with     Asthma       Initial BP (!) 140/100 (BP Location: Right arm, Patient Position: Chair, Cuff Size: Adult Large)   Pulse 69   Resp 16   Ht 1.702 m (5' 7\")   Wt 126.1 kg (278 lb)   SpO2 97%   BMI 43.54 kg/m   Estimated body mass index is 43.54 kg/m  as calculated from the following:    Height as of this encounter: 1.702 m (5' 7\").    Weight as of this encounter: 126.1 kg (278 lb).  Medication Reconciliation: complete  Pamela M. Lechevalier, LPN    "

## 2020-11-12 ASSESSMENT — ANXIETY QUESTIONNAIRES: GAD7 TOTAL SCORE: 0

## 2020-11-12 ASSESSMENT — ASTHMA QUESTIONNAIRES: ACT_TOTALSCORE: 10

## 2020-11-16 ENCOUNTER — TELEPHONE (OUTPATIENT)
Dept: FAMILY MEDICINE | Facility: OTHER | Age: 40
End: 2020-11-16

## 2020-11-16 NOTE — TELEPHONE ENCOUNTER
The patient brought a letter to work regarding wearing a mask and employer has further questions for clarification. Please call Pooja NORWOOD At [phone] to discuss.

## 2020-11-17 NOTE — TELEPHONE ENCOUNTER
Pooja Fontaine from [employer] employer at [employer] calling on clarification on a letter.Updated that unsure that this can be discussed,but will take message.    She would like any reason why he cannot wear a mask at all times at work.    Updated will take message-will not discuss at this time.    Her call back is [phone]      Please advise.      Sri Mei RN

## 2020-11-17 NOTE — TELEPHONE ENCOUNTER
Patient states that wearing a mask makes it hard to breath.  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

## 2021-04-08 NOTE — PROGRESS NOTES
"    Assessment & Plan     1. Cellulitis of finger of left hand  Continue wark soaks  - clindamycin (CLEOCIN) 300 MG capsule; Take 1 capsule (300 mg) by mouth 3 times daily for 10 days  Dispense: 30 capsule; Refill: 0  - mupirocin (BACTROBAN) 2 % external ointment; Apply topically 2 times daily  Dispense: 30 g; Refill: 0  - tetanus current             BMI:   Estimated body mass index is 43.07 kg/m  as calculated from the following:    Height as of this encounter: 1.702 m (5' 7\").    Weight as of this encounter: 124.7 kg (275 lb).     Follow-up as needed        Caridad Aviles NP  [facility] [place] EYAD Henley is a 40 year old who presents for the following health issues     HPI     Concern - left thumb   Onset: 6 weeks   Description: cut with  1 week ago  Intensity: mild  Progression of Symptoms:  worsening  Accompanying Signs & Symptoms: swollen red warm to touch   Previous history of similar problem: no   Precipitating factors:        Worsened by: unknown   Alleviating factors:        Improved by: nothing   Therapies tried and outcome: antibiotic cream  ointment antibacterial soap soak. Hydrogen peroxide     Patient Active Problem List   Diagnosis     Asthma     History of acute pancreatitis     Episodic mood disorder (H)     Hyperlipidemia with target LDL less than 100     Acquired spondylolisthesis of lumbosacral region     Lumbosacral radiculopathy at L3     Lumbar radicular pain     Morbid obesity (H)     Closed fracture of one rib of right side with routine healing     Past Surgical History:   Procedure Laterality Date     APPENDECTOMY  1995     LAPAROSCOPIC FUSION LUMBAR POSTERIOR TWO LEVELS  6/1/16    L 3-L4 fusion      LASIK  2010    Bilateral     OPTICAL TRACKING SYSTEM FUSION POSTERIOR SPINE LUMBAR Right 6/1/2016    Procedure: OPTICAL TRACKING SYSTEM FUSION SPINE POSTERIOR LUMBAR ONE LEVEL;  Surgeon: Devon Summers MD;  Location: [facility] OR     VASECTOMY Bilateral " 3/20/2018    Procedure: VASECTOMY;  Vasectomy;  Surgeon: Kale Story MD;  Location:  OR       Social History     Tobacco Use     Smoking status: Never Smoker     Smokeless tobacco: Never Used   Substance Use Topics     Alcohol use: Yes     Comment: OCCASIONAL     Family History   Problem Relation Age of Onset     Other - See Comments Father         aortiv anyerism     C.A.D. Father      Heart Disease Father      Lipids Father      Other - See Comments Maternal Grandmother 89        blood clot         Current Outpatient Medications   Medication Sig Dispense Refill     albuterol (PROAIR HFA/PROVENTIL HFA/VENTOLIN HFA) 108 (90 Base) MCG/ACT inhaler Inhale 2 puffs into the lungs every 6 hours as needed for shortness of breath / dyspnea 1 Inhaler 1     clindamycin (CLEOCIN) 300 MG capsule Take 1 capsule (300 mg) by mouth 3 times daily for 10 days 30 capsule 0     cyclobenzaprine (FLEXERIL) 10 MG tablet Take 1 tablet (10 mg) by mouth 3 times daily as needed for muscle spasms 120 tablet 0     EPINEPHrine (EPIPEN 2-MIKE) 0.3 MG/0.3ML injection Inject 0.3 mLs (0.3 mg) into the muscle once as needed for anaphylaxis 1 each 2     HYDROcodone-acetaminophen (NORCO) 5-325 MG per tablet Take 1-2 tablets by mouth every 4 hours as needed Take 1-2 tablets by mouth every 4-6 hours as needed for pain 12 tablet 0     ibuprofen 200 MG capsule Take with food or milk.    2 capsules as needed.       mupirocin (BACTROBAN) 2 % external ointment Apply topically 2 times daily 30 g 0     naproxen (NAPROSYN) 500 MG tablet Take 500 mg by mouth 4 times daily as needed        Allergies   Allergen Reactions     Penicillins      Childhood unknown     Bees Swelling     Recent Labs   Lab Test 11/11/20  1231 06/02/16  0738 05/31/16  1547 03/26/14  1500 02/11/14  1339 12/10/13  1503 11/20/13  0613 11/07/13  1215 11/07/13  1215   *  --   --   --   --   --   --   --  156*   HDL 54  --   --   --   --   --   --   --  46   TRIG 99  --   --   --   --   " --   --   --  175*   ALT  --   --   --   --  46 47 29   < >  --    CR  --  0.86 0.99 1.22 1.05 1.12 1.06   < > 1.23   GFRESTIMATED  --  >90  Non  GFR Calc   84 68 80 75 80   < > 67   GFRESTBLACK  --  >90   GFR Calc   >90   GFR Calc   82 >90 >90 >90   < > 81   POTASSIUM  --  4.0 4.5 4.2 4.5 3.9 3.8   < > 4.0   TSH  --   --   --  0.78  --   --   --   --  1.45    < > = values in this interval not displayed.      BP Readings from Last 3 Encounters:   04/09/21 (!) 138/92   11/11/20 (!) 140/100   03/17/20 132/84    Wt Readings from Last 3 Encounters:   04/09/21 124.7 kg (275 lb)   11/11/20 126.1 kg (278 lb)   03/17/20 123.2 kg (271 lb 11.2 oz)                      Review of Systems   Constitutional, HEENT, cardiovascular, pulmonary, gi and gu systems are negative, except as otherwise noted.      Objective    BP (!) 138/92 (BP Location: Right arm, Patient Position: Chair, Cuff Size: Adult Large)   Pulse 68   Temp 97.1  F (36.2  C) (Tympanic)   Resp 18   Ht 1.702 m (5' 7\")   Wt 124.7 kg (275 lb)   SpO2 97%   BMI 43.07 kg/m    Body mass index is 43.07 kg/m .  Physical Exam   GENERAL: healthy, alert and no distress  RESP: lungs clear to auscultation - no rales, rhonchi or wheezes  CV: regular rate and rhythm, normal S1 S2, no S3 or S4, no murmur, click or rub, no peripheral edema and peripheral pulses strong  MS: no gross musculoskeletal defects noted, no edema  SKIN: left thumb at CMC joint is erythematous, no drainage noted but skin is warm with palpation.  Full ROM of joint  PSYCH: mentation appears normal, affect normal/bright                        "

## 2021-04-09 ENCOUNTER — OFFICE VISIT (OUTPATIENT)
Dept: FAMILY MEDICINE | Facility: OTHER | Age: 41
End: 2021-04-09
Attending: NURSE PRACTITIONER
Payer: COMMERCIAL

## 2021-04-09 VITALS
DIASTOLIC BLOOD PRESSURE: 92 MMHG | WEIGHT: 275 LBS | OXYGEN SATURATION: 97 % | TEMPERATURE: 97.1 F | RESPIRATION RATE: 18 BRPM | HEART RATE: 68 BPM | SYSTOLIC BLOOD PRESSURE: 138 MMHG | BODY MASS INDEX: 43.16 KG/M2 | HEIGHT: 67 IN

## 2021-04-09 DIAGNOSIS — L03.012 CELLULITIS OF FINGER OF LEFT HAND: Primary | ICD-10-CM

## 2021-04-09 PROCEDURE — 99213 OFFICE O/P EST LOW 20 MIN: CPT | Performed by: NURSE PRACTITIONER

## 2021-04-09 RX ORDER — MUPIROCIN 20 MG/G
OINTMENT TOPICAL 2 TIMES DAILY
Qty: 30 G | Refills: 0 | Status: SHIPPED | OUTPATIENT
Start: 2021-04-09

## 2021-04-09 RX ORDER — CLINDAMYCIN HCL 300 MG
300 CAPSULE ORAL 3 TIMES DAILY
Qty: 30 CAPSULE | Refills: 0 | Status: SHIPPED | OUTPATIENT
Start: 2021-04-09 | End: 2021-04-19

## 2021-04-09 ASSESSMENT — PAIN SCALES - GENERAL: PAINLEVEL: MILD PAIN (2)

## 2021-04-09 ASSESSMENT — MIFFLIN-ST. JEOR: SCORE: 2106.02

## 2021-04-09 NOTE — NURSING NOTE
"Chief Complaint   Patient presents with     Finger       Initial BP (!) 138/92 (BP Location: Right arm, Patient Position: Chair, Cuff Size: Adult Large)   Pulse 68   Temp 97.1  F (36.2  C) (Tympanic)   Resp 18   Ht 1.702 m (5' 7\")   Wt 124.7 kg (275 lb)   SpO2 97%   BMI 43.07 kg/m   Estimated body mass index is 43.07 kg/m  as calculated from the following:    Height as of this encounter: 1.702 m (5' 7\").    Weight as of this encounter: 124.7 kg (275 lb).  Medication Reconciliation: complete  Pamela M. Lechevalier, LPN    "

## 2021-08-11 ENCOUNTER — OFFICE VISIT (OUTPATIENT)
Dept: FAMILY MEDICINE | Facility: OTHER | Age: 41
End: 2021-08-11
Attending: INTERNAL MEDICINE
Payer: OTHER MISCELLANEOUS

## 2021-08-11 VITALS
DIASTOLIC BLOOD PRESSURE: 88 MMHG | HEIGHT: 67 IN | HEART RATE: 98 BPM | OXYGEN SATURATION: 93 % | SYSTOLIC BLOOD PRESSURE: 138 MMHG | TEMPERATURE: 98.1 F | BODY MASS INDEX: 41.59 KG/M2 | WEIGHT: 265 LBS

## 2021-08-11 DIAGNOSIS — Z87.19 HISTORY OF HERNIA REPAIR: ICD-10-CM

## 2021-08-11 DIAGNOSIS — Z98.890 HISTORY OF HERNIA REPAIR: ICD-10-CM

## 2021-08-11 DIAGNOSIS — R10.31 RIGHT GROIN PAIN: Primary | ICD-10-CM

## 2021-08-11 PROCEDURE — 99213 OFFICE O/P EST LOW 20 MIN: CPT | Performed by: FAMILY MEDICINE

## 2021-08-11 ASSESSMENT — PAIN SCALES - GENERAL: PAINLEVEL: SEVERE PAIN (6)

## 2021-08-11 ASSESSMENT — MIFFLIN-ST. JEOR: SCORE: 2055.66

## 2021-08-11 NOTE — NURSING NOTE
"Chief Complaint   Patient presents with     Work Comp       Initial /88 (BP Location: Right arm, Patient Position: Chair, Cuff Size: Adult Large)   Pulse 98   Temp 98.1  F (36.7  C) (Tympanic)   Ht 1.702 m (5' 7\")   Wt 120.2 kg (265 lb)   SpO2 93%   BMI 41.50 kg/m   Estimated body mass index is 41.5 kg/m  as calculated from the following:    Height as of this encounter: 1.702 m (5' 7\").    Weight as of this encounter: 120.2 kg (265 lb).  Medication Reconciliation: complete  Odalis Stevenson LPN    "

## 2021-08-11 NOTE — PROGRESS NOTES
Occupational Visit     SUBJECTIVE:  Kale Rosario, 41 year old, male is seen for new evaluation and treatment of occupational injury. Date of injury is 8/11/21.    Linked Episodes   Type: Episode: Status: Noted: Resolved: Last update: Updated by:   WORK COMP Rebecca Nunez Active 8/11/2021 8/11/2021 12:58 PM Odalis Stevenson LPN      Comments:Date of Injury 8/11/21       Groin pain       Duration: 0830 this morning 8/11/21    Description (location/character/radiation):  Right side inguinal area      Intensity:  moderate, severe    Accompanying signs and symptoms: pain,buring     History (similar episodes/previous evaluation): hx of right sided inguinal herniagraphy with mesh- Work comp-from 2011    Precipitating or alleviating factors: taking a bearing off a spindle when setting down felt a pop    Therapies tried and outcome: None    Patient does have a history of inguinal hernia repair with mesh, which makes exam and diagnosis much more difficult.         Allergies   Allergen Reactions     Penicillins      Childhood unknown     Bees Swelling         Review of Systems:  Constitutional, HEENT, cardiovascular, pulmonary, gi and gu systems are negative, except as otherwise noted.      OBJECTIVE:  Vitals:    08/11/21 1248   BP: 138/88   Pulse: 98   Temp: 98.1  F (36.7  C)   SpO2: 93%               Exam:  GENERAL APPEARANCE: healthy, alert and no distress   (male): no lynn redness of point tenderness along right inguinal canal  PSYCH: mentation appears normal and affect normal/bright      Labs: No results found for this or any previous visit (from the past 24 hour(s)).      ASSESSMENT/PLAN:  1. Right groin pain  With history of hernia repair, I did recommend evaluation with surgeon.  He may need advanced imaging, but I think the surgeon should decide.  Referral ordered.  Patient is advised to monitor lifting, etc.  He will go to ER with any acute worsening of symptoms.  - Adult General Surg  Referral    2. History of hernia repair  As above.  - Adult General Surg Referral      Gabriela Morales MD

## 2021-09-08 ENCOUNTER — OFFICE VISIT (OUTPATIENT)
Dept: SURGERY | Facility: OTHER | Age: 41
End: 2021-09-08
Attending: FAMILY MEDICINE
Payer: OTHER MISCELLANEOUS

## 2021-09-08 VITALS
SYSTOLIC BLOOD PRESSURE: 110 MMHG | OXYGEN SATURATION: 96 % | DIASTOLIC BLOOD PRESSURE: 70 MMHG | TEMPERATURE: 97.2 F | HEART RATE: 94 BPM | WEIGHT: 265 LBS | BODY MASS INDEX: 41.5 KG/M2

## 2021-09-08 DIAGNOSIS — Z87.19 HISTORY OF HERNIA REPAIR: ICD-10-CM

## 2021-09-08 DIAGNOSIS — Z98.890 HISTORY OF HERNIA REPAIR: ICD-10-CM

## 2021-09-08 DIAGNOSIS — R10.31 RIGHT GROIN PAIN: ICD-10-CM

## 2021-09-08 PROCEDURE — 99203 OFFICE O/P NEW LOW 30 MIN: CPT | Performed by: SURGERY

## 2021-09-08 ASSESSMENT — PAIN SCALES - GENERAL: PAINLEVEL: NO PAIN (0)

## 2021-09-08 NOTE — PROGRESS NOTES
CLINIC NOTE - CONSULT  9/8/2021    Patient:Kale SMITH Abraham  Referring Physician: Gabriela Morales    Reason for Referral: Right Inguinal Hernia    This is a 41 year old male with a right  inguinal hernia.   Recurrent: YES   Reducible : NO   Symptomatic : YES   Pain : YES   Has noticed it for 3-4 weeks weeks   Has had imaging : NO   Obstructive symptoms : NO     Patient does have a history of a hernia repair back in 2013.  This was done anteriorly.  Was lifting at work and noted pain in his right groin.  Continues to be painful especially during strenuous lifting activities.  Does not really feel a bulge.  Though there is point tenderness.    Past Medical History:  Past Medical History:   Diagnosis Date     Asthma,unspecified type, unspecified 02/21/2011     History of acute pancreatitis     hospitalized 2013     Hyperlipidemia LDL goal < 100 4/1/2014     Unspecified episodic mood disorder 4/1/2014       Past Surgical History:  Past Surgical History:   Procedure Laterality Date     APPENDECTOMY  1995     LAPAROSCOPIC FUSION LUMBAR POSTERIOR TWO LEVELS  6/1/16    L 3-L4 fusion      LASIK  2010    Bilateral     OPTICAL TRACKING SYSTEM FUSION POSTERIOR SPINE LUMBAR Right 6/1/2016    Procedure: OPTICAL TRACKING SYSTEM FUSION SPINE POSTERIOR LUMBAR ONE LEVEL;  Surgeon: Devon Summers MD;  Location: [facility] OR     VASECTOMY Bilateral 3/20/2018    Procedure: VASECTOMY;  Vasectomy;  Surgeon: Kale Story MD;  Location: [facility] OR       Family History History:  Family History   Problem Relation Age of Onset     Other - See Comments Father         aortiv anyerism     C.A.D. Father      Heart Disease Father      Lipids Father      Other - See Comments Maternal Grandmother 89        blood clot       History of Tobacco Use:  History   Smoking Status     Never Smoker   Smokeless Tobacco     Never Used       Current Medications:  Current Outpatient Medications   Medication Sig Dispense Refill     albuterol (PROAIR HFA/PROVENTIL  HFA/VENTOLIN HFA) 108 (90 Base) MCG/ACT inhaler Inhale 2 puffs into the lungs every 6 hours as needed for shortness of breath / dyspnea 1 Inhaler 1     cyclobenzaprine (FLEXERIL) 10 MG tablet Take 1 tablet (10 mg) by mouth 3 times daily as needed for muscle spasms 120 tablet 0     EPINEPHrine (EPIPEN 2-MIKE) 0.3 MG/0.3ML injection Inject 0.3 mLs (0.3 mg) into the muscle once as needed for anaphylaxis 1 each 2     HYDROcodone-acetaminophen (NORCO) 5-325 MG per tablet Take 1-2 tablets by mouth every 4 hours as needed Take 1-2 tablets by mouth every 4-6 hours as needed for pain 12 tablet 0     ibuprofen 200 MG capsule Take with food or milk.    2 capsules as needed.       mupirocin (BACTROBAN) 2 % external ointment Apply topically 2 times daily 30 g 0     naproxen (NAPROSYN) 500 MG tablet Take 500 mg by mouth 4 times daily as needed          Allergies:  Allergies   Allergen Reactions     Penicillins      Childhood unknown     Bees Swelling       ROS:  Pertinent items are noted in HPI.  All other systems are negative.    PHYSICAL EXAM:     Vital signs: /70 (BP Location: Right arm, Patient Position: Sitting, Cuff Size: Adult Large)   Pulse 94   Temp 97.2  F (36.2  C) (Tympanic)   Wt 120.2 kg (265 lb)   SpO2 96%   BMI 41.50 kg/m     Weight: [unfilled]   BMI: Body mass index is 41.5 kg/m .   General: Normal, healthy, cooperative, in no acute distress   Skin: no jaundice   HEENT: PERRLA and EOMI   Neck: supple   Lungs: clear to auscultation   CV: Regular rate and rhythm without murmer   Abdominal: abdomen is soft without significant tenderness, masses, organomegaly or guarding   Extremities: No cyanosis, clubbing or edema noted bilaterally in Upper and Lower Extremities   Neurological: without deficit     Groin:  Normal male genitalia.  There is no palpable defect noted.  Along his testicular cord on the right there is an area of tenderness and fullness.  No definite hernia is noted.      ASSESSMENT: 41 year old male  with groin pain.  This occurred after heavy lifting at work.     PLAN: We will go ahead and get a CT of the abdomen pelvis to look at the abdominal wall and an ultrasound of his right groin to look at his testicular cord to better delineate if there is a hernia and/or anything within his spermatic cord.  Follow-up after the tests for discussion on the next step.      The workability was filled out and no restrictions were given at this time.

## 2021-09-08 NOTE — PATIENT INSTRUCTIONS
Thank you for allowing Dr. Dobson and our surgical team to participate in your care. Please call our health unit coordinator at [phone] with scheduling questions or the nurse at [phone] with any other questions or concerns.

## 2021-09-08 NOTE — NURSING NOTE
"Chief Complaint   Patient presents with     Consult     [id number]- right groin pain, history of hernia repair. Do workability form       Initial /70 (BP Location: Right arm, Patient Position: Sitting, Cuff Size: Adult Large)   Pulse 94   Temp 97.2  F (36.2  C) (Tympanic)   Wt 120.2 kg (265 lb)   SpO2 96%   BMI 41.50 kg/m   Estimated body mass index is 41.5 kg/m  as calculated from the following:    Height as of 8/11/21: 1.702 m (5' 7\").    Weight as of this encounter: 120.2 kg (265 lb).  Medication Reconciliation: complete  Daniella Bay LPN  "

## 2021-09-14 ENCOUNTER — OFFICE VISIT (OUTPATIENT)
Dept: SURGERY | Facility: OTHER | Age: 41
End: 2021-09-14
Payer: COMMERCIAL

## 2021-09-14 DIAGNOSIS — Z53.9 ERRONEOUS ENCOUNTER--DISREGARD: Primary | ICD-10-CM

## 2021-10-18 ENCOUNTER — TRANSFERRED RECORDS (OUTPATIENT)
Dept: HEALTH INFORMATION MANAGEMENT | Facility: CLINIC | Age: 41
End: 2021-10-18

## 2022-04-12 NOTE — TELEPHONE ENCOUNTER
Lacey Campuzano 1940 is a 78 y o  female     Follow up visit     78year old female returns for follow-up for right breast DCIS  She completed adjuvant radiation to the right breast on 8/28/2020  She met with Dr Jeremy Vargas, adjuvant hormonal therapy was not recommended  She was last seen 4/6/21  She also has a history of Stage IB RUL adenocarcinoma lung cancer, s/p wedge resection in Jan 2020 4/14/21 Viri CAMARA   Continues to have residual hematoma in the lumpectomy bed  Advised massage to the breast as well as the upper chest wall scar tissue in use warm compresses as needed  6/7/21 CT chest wo contrast  IMPRESSION:   No definite tumor recurrence  5 mm right lower lobe nodule, stable since November 2020, slightly increased from July 2020  This may be benign and part of an adjacent scar, but attention will be directed to this lesion on follow-up  10/26/21 Viri CAMARA  Continues to have discomfort in the right lumpectomy bed as well as the scar tissue from the lobectomy      12/3/21 CT chest wo contrast  IMPRESSION:   1  The subpleural right lower lobe nodule has not increased in size, currently measuring slightly smaller (4 mm versus 5 mm  2 mm nodule adjacent to the right diaphragm is unchanged  No new or enlarging nodules to suggest pulmonary metastasis  2  No evidence of mediastinal or hilar adenopathy  She is followed by thoracic surgery, last seen by Dr Manuela Walker on 12/8/21      3/10/22 US thyroid  IMPRESSION:   Heterogeneous left thyroid lobe with stable subcentimeter nodule  No new nodules  No nodule meets current ACR criteria for requiring biopsy or follow-up ultrasounds       Status post right thyroidectomy      3/18/22 [facility]  Following for thyroid nodule, stable, continue surveillance      3/31/22 Mammo diagnostic bilateral w 3d & cad; US breast right limited (diagnostic)  IMPRESSION:  Postoperative changes including seroma in the right Son tested positive for strep, requesting antibiotic if shows sx's.  Ok per Armida , RX for Z-maritza written    breast       ASSESSMENT/BI-RADS CATEGORY:  Right: 2 - Benign  Overall: 2 - Benign     RECOMMENDATION: Diagnostic mammogram in 1 year for both breasts  Today, she reports feeling well  No breast complaints  She reports seroma at right breast incisional area has been decreasing in size  Upcomin22 Rg Luisg  22 Gyn, [place]  22 CT chest  22 Thoracic Surg, [place]  3/23/23 US thyroid  [facility]  [facility] Mammo diagnostic bilateral           Oncology History   Personal history of in-situ neoplasm of breast   2019 Initial Diagnosis    Ductal carcinoma in situ (DCIS) of right breast     2020 Surgery    A  Breast, right, lumpectomy:  -  Ductal carcinoma in situ, low nuclear grade   B  Breast, right new inferior margin, excision:  -  Ductal carcinoma in situ, low nuclear grade involving intraductal papilloma  C   Breast, right new lateral margin, excision:  -  Ductal carcinoma in situ, low nuclear grade  D   Breast, right new posterior margin, excision:  -  Benign breast parenchyma, negative for atypia or malignancy      %, PA 60-70%, HER2 1+ negative     2020 -  Cancer Staged    Staging form: Breast, AJCC 8th Edition  - Clinical: cT0, cN0, cM0 - Signed by Elvin Archibald MD on 2020  Laterality: Right       2020 -  Cancer Staged    Staging form: Breast, AJCC 8th Edition  - Pathologic: Stage 0 (pTis (DCIS), pN0, cM0, ER+, PA+, HER2-) - Signed by Elvin Archibald MD on 2020  Laterality: Right  Method of lymph node assessment: Clinical  Nuclear grade: G1       2020 - 2020 Radiation    Plan ID Energy Fractions Dose per Fraction (cGy) Dose Correction (cGy) Total Dose Delivered (cGy) Elapsed Days   R Breast 6X 16 / 16 266 0 4,256 21   R Brst Boost 6X 5 / 5 200 0 1,000 4        Primary adenocarcinoma of upper lobe of right lung ([address] ) (Resolved)   2019 Initial Diagnosis    [facility] adenocarcinoma of upper lobe of right [facility] )     1/17/2020 Surgery    Lung, right upper lobe, wedge resection:  -  Invasive moderately differentiated adenocarcinoma  Stage IB         Review of Systems:  Review of Systems   Constitutional: Positive for fatigue  HENT: Negative  Eyes: Negative  Respiratory: Negative  Negative for cough, choking and wheezing  Cardiovascular: Negative  Gastrointestinal: Negative  Negative for abdominal pain (occasional RUQ tenderness/discomfort since lung surgery)  Endocrine: Negative  Genitourinary: Negative  Musculoskeletal: Positive for arthralgias (occasonal hand stiffness)  Negative for gait problem  Skin: Positive for color change (mild hyperpigmentation to right breast at incisional area  )  Allergic/Immunologic: Negative  Neurological: Negative  Negative for dizziness, light-headedness and numbness  Hematological: Negative  Psychiatric/Behavioral: Negative  Negative for dysphoric mood         Clinical Trial: no    Teaching    Covid Vaccine Status: Pfizer x3    Health Maintenance   Topic Date Due    Influenza Vaccine (1) 09/01/2021    BMI: Followup Plan  09/03/2021    Fall Risk  03/19/2022    Medicare Annual Wellness Visit (AWV)  03/19/2022    Depression Screening  04/06/2022    Pneumococcal Vaccine: 65+ Years (1 of 1 - PPSV23) 09/23/2022 (Originally 7/22/2003)    DTaP,Tdap,and Td Vaccines (1 - Tdap) 09/23/2022 (Originally 7/22/1961)    BMI: Adult  03/31/2023    Osteoporosis Screening  Completed    COVID-19 Vaccine  Completed    HIB Vaccine  Aged Out    Hepatitis B Vaccine  Aged Out    IPV Vaccine  Aged Out    Hepatitis A Vaccine  Aged Out    Meningococcal ACWY Vaccine  Aged Out    HPV Vaccine  Aged Out     Patient Active Problem List   Diagnosis    A-fib [facility] )    Benign essential hypertension    Coagulopathy ([facility] )    History of colon cancer    Overweight    Osteoarthritis    Postoperative hypothyroidism    Mixed hyperlipidemia    Medicare annual wellness visit, subsequent    Osteopenia of multiple sites    Personal history of in-situ neoplasm of breast    Dense breast tissue    Hashimoto's thyroiditis    History of lung cancer    Thyroid nodule    Encounter for follow-up surveillance of breast cancer     Past Medical History:   Diagnosis Date    Anesthesia     "cheap date"    Arthritis     Atrial fibrillation [facility]     Benign polyp of large intestine     Breast cancer ([facility] ) 2020    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    Colon cancer [facility]) 44    36 yo    Colon polyp     Full dentures     full upper/ partial lower    GERD (gastroesophageal reflux disease)     Glaucoma suspect     History of neck problems     "C3-C7 and had epidural injections years ago"    Hyperlipidemia     Hypertension     Lung cancer ([facility] )     jan 2020- had surgery    Parathyroid cancer ([facility] )     Prediabetes     Primary adenocarcinoma of upper lobe of right lung ([facility] ) 12/2/2019    Pulmonary nodules 2/19/2015    Refusal of blood transfusions as patient is Gnosticist     Tic disorder, transient, recurrent     not recent    Walks frequently      Past Surgical History:   Procedure Laterality Date    APPENDECTOMY      ARTHROSCOPY KNEE      BREAST BIOPSY Right 04/10/2017    US guided - intraductal papillomas    BREAST BIOPSY Right 10/24/2019    ADH    BREAST LUMPECTOMY W/ NEEDLE LOCALIZATION Right 5/22/2020    Procedure: EXCISIONAL BIOPSY;  1000 NEEDLE LOC;  Surgeon: Whitley Olmstead MD;  Location: [facility];  Service: Surgical Oncology    CARDIOVASCULAR STRESS TEST      CATARACT EXTRACTION Bilateral     COLON SURGERY      COLONOSCOPY      done 2010 due 2015 Dr Kuo    DILATION AND CURETTAGE OF UTERUS      ESOPHAGOGASTRODUODENOSCOPY      inflammation aonly    HEMICOLECTOMY Right 1977    HYSTERECTOMY      JOINT REPLACEMENT      L knee    LUNG SURGERY      MAMMO NEEDLE LOCALIZATION RIGHT (ALL INC) Right 5/22/2020    MAMMO NEEDLE LOCALIZATION RIGHT (ALL INC) EACH ADD Right 5/22/2020    MAMMO STEREOTACTIC BREAST BIOPSY RIGHT (ALL INC) Right 10/24/2019    PARATHYROID GLAND SURGERY      exploration     DE BRONCHOSCOPY,DIAGNOSTIC N/A 1/17/2020    Procedure: FLEXIBLE BRONCHOSCOPY;  Surgeon: Rajendra Damon MD;  [facility];  Service: Thoracic    DE THORACOSCOPY SURG LOBECTOMY Right 1/17/2020    Procedure: ROBOTIC ASSISTED COMPLETE LUNG LOBECTOMY;  Surgeon: Rajendra Damon MD;  [facility];  Service: Thoracic    DE THORACOSCOPY Meka Silva WEDGE RESEXN INITIAL UNILAT Right 1/17/2020    Procedure: ROBOTIC ASSISTED UPPER-LOBE WEDGE RESECTION, MEDIASTINAL LYMPH NODE DISECTION;  Surgeon: Rajendra Damon MD;  [facility];  Service: Thoracic    REPLACEMENT TOTAL KNEE      THYROID SURGERY      TOTAL ABDOMINAL HYSTERECTOMY  1983    TOTAL THYROIDECTOMY      US GUIDED BREAST BIOPSY RIGHT COMPLETE Right 4/10/2017    US GUIDED THYROID BIOPSY  2/14/2020     Family History   Problem Relation Age of Onset    Esophageal cancer Mother         [de-identified]    Hypertension Mother     Stroke Family         TIA    Breast cancer Paternal Aunt 80    No Known Problems Father     COPD Sister     COPD Sister     No Known Problems Maternal Grandmother     No Known Problems Maternal Grandfather     No Known Problems Paternal Grandmother     No Known Problems Paternal Grandfather     No Known Problems Maternal Aunt     No Known Problems Maternal Aunt     No Known Problems Maternal Aunt     No Known Problems Maternal Aunt     No Known Problems Maternal Aunt      Social History     Socioeconomic History    Marital status: Single     Spouse name: Not on file    Number of children: Not on file    Years of education: Not on file    Highest education level: Not on file   Occupational History    Not on file   Tobacco Use    Smoking status: Former Smoker     Packs/day: 1 50     Years: 20 00     Pack years: 30 00     Types: Cigarettes     Quit date: 5     Years since quittin 3    Smokeless tobacco: Never Used   Vaping Use    Vaping Use: Never used   Substance and Sexual Activity    Alcohol use:  Yes     Alcohol/week: 5 0 standard drinks     Types: 5 Cans of beer per week     Comment: occasional beer with food    Drug use: No    Sexual activity: Not on file   Other Topics Concern    Not on file   Social History Narrative    Caffeine use- 1-3, 8oz coffees per day    Denied history of housing without smoke detectors    Always uses a seatbelt     Social Determinants of Health     Financial Resource Strain: Not on file   Food Insecurity: Not on file   Transportation Needs: Not on file   Physical Activity: Not on file   Stress: Not on file   Social Connections: Not on file   Intimate Partner Violence: Not on file   Housing Stability: Not on file       Current Outpatient Medications:     apixaban (ELIQUIS) 5 mg, Take 5 mg by mouth 2 (two) times a day To stop 2 days prior to surgery per md, Disp: , Rfl:     atorvastatin (LIPITOR) 80 mg tablet, TAKE 1 TABLET BY MOUTH EVERY DAY, Disp: 90 tablet, Rfl: 1    Coenzyme Q10 (CO Q 10 PO), Take 200 mg by mouth, Disp: , Rfl:     cyanocobalamin (VITAMIN B-12) 1000 MCG tablet, Take 1,000 mcg by mouth, Disp: , Rfl:     diltiazem (CARDIZEM CD) 120 mg 24 hr capsule, Take 120 mg by mouth every evening , Disp: , Rfl:     levothyroxine 100 mcg tablet, TAKE 1 TABLET BY MOUTH EVERY DAY, Disp: 90 tablet, Rfl: 1    Multiple Vitamins-Calcium (ESSENTIAL ONE DAILY MULTIVIT PO), Take by mouth daily , Disp: , Rfl:     Omega-3 Fatty Acids (FISH OIL PO), Take 2 g by mouth daily , Disp: , Rfl:     omeprazole (PriLOSEC) 20 mg delayed release capsule, TAKE 1 CAPSULE (20 MG TOTAL) BY MOUTH AS NEEDED (GERD), Disp: 90 capsule, Rfl: 0    FIBER PO, Take 1 capsule by mouth daily as needed (Patient not taking: Reported on 2021), Disp: , Rfl:     Hydrocortisone Acetate (Vagisil) 1 % CREA, Apply topically (Patient not taking: Reported on 3/18/2022 ), Disp: , Rfl:     mupirocin (BACTROBAN) 2 % ointment, APPLY TO SORE AREA ON BOTTOM TWICE DAILY UNTIL HEALED (Patient not taking: Reported on 3/18/2022), Disp: , Rfl:   Allergies   Allergen Reactions    Bee Venom      Tongue swelling    Flurbiprofen Hives    Ibuprofen Shortness Of Breath    Levaquin [Levofloxacin In D5w] Other (See Comments)     Dark spots under eyes and wrists    Tequin [Gatifloxacin] Rash     Vitals:    04/12/22 0830   BP: 112/70   BP Location: Left arm   Patient Position: Sitting   Pulse: 83   Resp: 16   Temp: (!) 97 2 °F (36 2 °C)   TempSrc: Temporal   SpO2: 96%   Height: 5' 4" (1 626 m)      Pain Score: 0-No pain

## 2022-06-02 ENCOUNTER — OFFICE VISIT (OUTPATIENT)
Dept: FAMILY MEDICINE | Facility: OTHER | Age: 42
End: 2022-06-02
Attending: FAMILY MEDICINE
Payer: COMMERCIAL

## 2022-06-02 VITALS
OXYGEN SATURATION: 97 % | WEIGHT: 279.6 LBS | BODY MASS INDEX: 43.88 KG/M2 | DIASTOLIC BLOOD PRESSURE: 76 MMHG | RESPIRATION RATE: 18 BRPM | TEMPERATURE: 98 F | SYSTOLIC BLOOD PRESSURE: 104 MMHG | HEART RATE: 88 BPM | HEIGHT: 67 IN

## 2022-06-02 DIAGNOSIS — Z13.220 SCREENING FOR HYPERLIPIDEMIA: ICD-10-CM

## 2022-06-02 DIAGNOSIS — M79.674 GREAT TOE PAIN, RIGHT: ICD-10-CM

## 2022-06-02 DIAGNOSIS — J45.20 MILD INTERMITTENT ASTHMA WITHOUT COMPLICATION: Primary | ICD-10-CM

## 2022-06-02 PROCEDURE — 99213 OFFICE O/P EST LOW 20 MIN: CPT | Performed by: FAMILY MEDICINE

## 2022-06-02 ASSESSMENT — ASTHMA QUESTIONNAIRES
QUESTION_4 LAST FOUR WEEKS HOW OFTEN HAVE YOU USED YOUR RESCUE INHALER OR NEBULIZER MEDICATION (SUCH AS ALBUTEROL): NOT AT ALL
ACT_TOTALSCORE: 18
QUESTION_3 LAST FOUR WEEKS HOW OFTEN DID YOUR ASTHMA SYMPTOMS (WHEEZING, COUGHING, SHORTNESS OF BREATH, CHEST TIGHTNESS OR PAIN) WAKE YOU UP AT NIGHT OR EARLIER THAN USUAL IN THE MORNING: NOT AT ALL
QUESTION_1 LAST FOUR WEEKS HOW MUCH OF THE TIME DID YOUR ASTHMA KEEP YOU FROM GETTING AS MUCH DONE AT WORK, SCHOOL OR AT HOME: NONE OF THE TIME
QUESTION_5 LAST FOUR WEEKS HOW WOULD YOU RATE YOUR ASTHMA CONTROL: POORLY CONTROLLED
QUESTION_2 LAST FOUR WEEKS HOW OFTEN HAVE YOU HAD SHORTNESS OF BREATH: MORE THAN ONCE A DAY
ACT_TOTALSCORE: 18

## 2022-06-02 ASSESSMENT — PAIN SCALES - GENERAL: PAINLEVEL: NO PAIN (0)

## 2022-06-02 ASSESSMENT — PATIENT HEALTH QUESTIONNAIRE - PHQ9: SUM OF ALL RESPONSES TO PHQ QUESTIONS 1-9: 18

## 2022-06-02 NOTE — NURSING NOTE
"Chief Complaint   Patient presents with     RECHECK       Initial /76 (BP Location: Right arm, Patient Position: Sitting, Cuff Size: Adult Large)   Pulse 88   Temp 98  F (36.7  C) (Tympanic)   Resp 18   Ht 1.702 m (5' 7\")   Wt 126.8 kg (279 lb 9.6 oz)   SpO2 97%   BMI 43.79 kg/m   Estimated body mass index is 43.79 kg/m  as calculated from the following:    Height as of this encounter: 1.702 m (5' 7\").    Weight as of this encounter: 126.8 kg (279 lb 9.6 oz).  Medication Reconciliation: complete  Heena Robb MA  "

## 2022-06-02 NOTE — PROGRESS NOTES
"  Assessment & Plan     1. Mild intermittent asthma without complication  PFTs ordered due to ongoing and worsening symptoms.  Follow-up with results when available.  - General PFT Lab (Please always keep checked); Future  - Pulmonary Function Test; Future  - General PFT Lab (Please always keep checked)    2. 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 Great toe pain, right  Referral ordered.  - Orthopedic  Referral; Future       BMI:   Estimated body mass index is 43.79 kg/m  as calculated from the following:    Height as of this encounter: 1.702 m (5' 7\").    Weight as of this encounter: 126.8 kg (279 lb 9.6 oz).     Return if symptoms worsen or fail to improve.    Gabriela Morales MD  [facility] - SERJIO Henley is a 41 year old who presents for the following health issues     HPI     Concern - follow up DOT physical  Onset: 05/13/22  Description: Cholesterol was high and has some asthma concerns(pulmonology testing)  Intensity: mild  Progression of Symptoms:  worsening  Accompanying Signs & Symptoms: PFT's were not as good as last appointment  Previous history of similar problem: none  Precipitating factors:        Worsened by: none  Alleviating factors:        Improved by: none  Therapies tried and outcome: has inhaler    Patient does continue to have pain in foot, he wonders about going back to Orthopedics.    Review of Systems   Constitutional, HEENT, cardiovascular, pulmonary, gi and gu systems are negative, except as otherwise noted.      Objective    /76 (BP Location: Right arm, Patient Position: Sitting, Cuff Size: Adult Large)   Pulse 88   Temp 98  F (36.7  C) (Tympanic)   Resp 18   Ht 1.702 m (5' 7\")   Wt 126.8 kg (279 lb 9.6 oz)   SpO2 97%   BMI 43.79 kg/m    Body mass index is 43.79 kg/m .  Physical Exam   GENERAL: healthy, alert and no " distress  PSYCH: mentation appears normal, affect normal/bright

## 2022-06-02 NOTE — LETTER
My Asthma Action Plan    Name: Kale Rosario   YOB: 1980  Date: 6/2/2022   My doctor: Gabriela Morales MD   My clinic: [facility]        My Rescue Medicine:   Albuterol inhaler (Proair/Ventolin/Proventil HFA)  2-4 puffs EVERY 4 HOURS as needed. Use a spacer if recommended by your provider.   My Asthma Severity:   Intermittent / Exercise Induced  Know your asthma triggers: exercise or sports  exercise or sports          GREEN ZONE   Good Control    I feel good    No cough or wheeze    Can work, sleep and play without asthma symptoms       Take your asthma control medicine every day.     1. If exercise triggers your asthma, take your rescue medication    15 minutes before exercise or sports, and    During exercise if you have asthma symptoms  2. Spacer to use with inhaler: If you have a spacer, make sure to use it with your inhaler             YELLOW ZONE Getting Worse  I have ANY of these:    I do not feel good    Cough or wheeze    Chest feels tight    Wake up at night   1. Keep taking your Green Zone medications  2. Start taking your rescue medicine:    every 20 minutes for up to 1 hour. Then every 4 hours for 24-48 hours.  3. If you stay in the Yellow Zone for more than 12-24 hours, contact your doctor.  4. If you do not return to the Green Zone in 12-24 hours or you get worse, start taking your oral steroid medicine if prescribed by your provider.           RED ZONE Medical Alert - Get Help  I have ANY of these:    I feel awful    Medicine is not helping    Breathing getting harder    Trouble walking or talking    Nose opens wide to breathe       1. Take your rescue medicine NOW  2. If your provider has prescribed an oral steroid medicine, start taking it NOW  3. Call your doctor NOW  4. If you are still in the Red Zone after 20 minutes and you have not reached your doctor:    Take your rescue medicine again and    Call 911 or go to the emergency room right away    See your  regular doctor within 2 weeks of an Emergency Room or Urgent Care visit for follow-up treatment.          Annual Reminders:  Meet with Asthma Educator,  Flu Shot in the Fall, consider Pneumonia Vaccination for patients with asthma (aged 19 and older).    Pharmacy: [facility] [id number] [place] [address]    Electronically signed by Gabriela Morales MD   Date: 06/02/22                    Asthma Triggers  How To Control Things That Make Your Asthma Worse    Triggers are things that make your asthma worse.  Look at the list below to help you find your triggers and   what you can do about them. You can help prevent asthma flare-ups by staying away from your triggers.      Trigger                                                          What you can do   Cigarette Smoke  Tobacco smoke can make asthma worse. Do not allow smoking in your home, car or around you.  Be sure no one smokes at a child s day care or school.  If you smoke, ask your health care provider for ways to help you quit.  Ask family members to quit too.  Ask your health care provider for a referral to Quit Plan to help you quit smoking, or call 5-001-382-PLAN.     Colds, Flu, Bronchitis  These are common triggers of asthma. Wash your hands often.  Don t touch your eyes, nose or mouth.  Get a flu shot every year.     Dust Mites  These are tiny bugs that live in cloth or carpet. They are too small to see. Wash sheets and blankets in hot water every week.   Encase pillows and mattress in dust mite proof covers.  Avoid having carpet if you can. If you have carpet, vacuum weekly.   Use a dust mask and HEPA vacuum.   Pollen and Outdoor Mold  Some people are allergic to trees, grass, or weed pollen, or molds. Try to keep your windows closed.  Limit time out doors when pollen count is high.   Ask you health care provider about taking medicine during allergy season.     Animal Dander  Some people are allergic to skin flakes, urine or saliva from  pets with fur or feathers. Keep pets with fur or feathers out of your home.    If you can t keep the pet outdoors, then keep the pet out of your bedroom.  Keep the bedroom door closed.  Keep pets off cloth furniture and away from stuffed toys.     Mice, Rats, and Cockroaches  Some people are allergic to the waste from these pests.   Cover food and garbage.  Clean up spills and food crumbs.  Store grease in the refrigerator.   Keep food out of the bedroom.   Indoor Mold  This can be a trigger if your home has high moisture. Fix leaking faucets, pipes, or other sources of water.   Clean moldy surfaces.  Dehumidify basement if it is damp and smelly.   Smoke, Strong Odors, and Sprays  These can reduce air quality. Stay away from strong odors and sprays, such as perfume, powder, hair spray, paints, smoke incense, paint, cleaning products, candles and new carpet.   Exercise or Sports  Some people with asthma have this trigger. Be active!  Ask your doctor about taking medicine before sports or exercise to prevent symptoms.    Warm up for 5-10 minutes before and after sports or exercise.     Other Triggers of Asthma  Cold air:  Cover your nose and mouth with a scarf.  Sometimes laughing or crying can be a trigger.  Some medicines and food can trigger asthma.

## 2022-06-02 NOTE — COMMUNITY RESOURCES LIST (ENGLISH)
06/02/2022   [facility] - Outpatient Clinics  Heena Robb  For questions about this resource list or additional care needs, please contact your primary care clinic or care manager.  Phone: [phone]   Email: N/A   Address: [address] [place] [id number]   Hours: N/A        Mental Health       Individual counseling  1  [facility] ([facility] Distance: 4.67 miles      COVID-19 Status: Phone/Virtual   [address] [address]  Language: English  Hours: Mon - Fri 8:00 AM - 5:00 PM  Fees: Insurance, Self Pay   Phone: (719) 137-5119 Website: [email or web address]     2  [employer] Distance: 4.68 miles      COVID-19 Status: Unable to Verify   [address] [place] [address]  Language: English  Hours: Mon - Thu 9:00 AM - 5:00 PM , Fri 9:00 AM - 3:00 PM  Fees: Insurance, Self Pay   Phone: (706) [phone] Website: [email or web address]          Important Numbers & Websites       Emergency Services   911  City Services   311  Poison Control   (787) 173-6240  Suicide Prevention Lifeline   (884) 720-6729 (TALK)  Child Abuse Hotline   (776) 534-9635 (4-A-Child)  Sexual Assault Hotline   (566) 498-3003 (HOPE)  National Runaway Safeline   (327) 779-8640 (RUNAWAY)  All-Options Talkline   (671) 569-7664  Substance Abuse Referral   (802) 699-9615 (HELP)

## 2022-06-03 ENCOUNTER — LAB (OUTPATIENT)
Dept: LAB | Facility: OTHER | Age: 42
End: 2022-06-03
Payer: COMMERCIAL

## 2022-06-03 DIAGNOSIS — Z13.220 SCREENING FOR HYPERLIPIDEMIA: ICD-10-CM

## 2022-06-03 LAB
CHOLEST SERPL-MCNC: 231 MG/DL
FASTING STATUS PATIENT QL REPORTED: YES
HDLC SERPL-MCNC: 43 MG/DL
HOLD SPECIMEN: NORMAL
LDLC SERPL CALC-MCNC: 159 MG/DL
NONHDLC SERPL-MCNC: 188 MG/DL
TRIGL SERPL-MCNC: 145 MG/DL

## 2022-06-03 PROCEDURE — 80061 LIPID PANEL: CPT

## 2022-06-03 PROCEDURE — 36415 COLL VENOUS BLD VENIPUNCTURE: CPT

## 2022-07-18 ENCOUNTER — TRANSFERRED RECORDS (OUTPATIENT)
Dept: HEALTH INFORMATION MANAGEMENT | Facility: CLINIC | Age: 42
End: 2022-07-18

## 2022-09-07 NOTE — PROGRESS NOTES
[facility]  [address]  [facility] (formerly [facility] [address]  Phone: [phone]  Primary Provider: Josemanuel Morales  Pre-op Performing Provider: JOSEMANUEL MORALES      PREOPERATIVE EVALUATION:  Today's date: 9/8/2022    Kale Rosario is a 42 year old male who presents for a preoperative evaluation.    Surgical Information:  Surgery/Procedure: Right 1st MPJ Cheilectomy  Surgery Location: [place], [place]  Surgeon: Dr. Patsy Rhodes  Surgery Date: 9/13/22  Time of Surgery: TBD  Where patient plans to recover: At home with family  Fax number for surgical facility: PAC to fax    Type of Anesthesia Anticipated: to be determined    Assessment & Plan     The proposed surgical procedure is considered INTERMEDIATE risk.      ICD-10-CM    1. Pre-operative general physical examination  Z01.818 CBC with platelets     Basic metabolic panel     EKG 12-lead complete w/read - (Clinic Performed)     CBC with platelets     Basic metabolic panel   2. Bone spur of foot  M77.50    3. Mild intermittent asthma without complication  J45.20    4. Episodic mood disorder (H)  F39    5. Hyperlipidemia with target LDL less than 100  E78.5    6. Morbid obesity (H)  E66.01         Possible Sleep Apnea: Monitor symptoms       Risks and Recommendations:  The patient has the following additional risks and recommendations for perioperative complications:   - No identified additional risk factors other than previously addressed    Medication Instructions:  Hold all ASA/NSAIDs/Vitamins/Supplements 7-10 days prior to procedure     RECOMMENDATION:  APPROVAL GIVEN to proceed with proposed procedure, without further diagnostic evaluation.        Subjective     HPI related to upcoming procedure: Symptomatic bone spur of right foot      Preop Questions 9/8/2022   1. Have you ever had a heart attack or stroke? No   2. Have you ever had surgery on your heart or blood vessels, such as a stent placement, a  coronary artery bypass, or surgery on an artery in your head, neck, heart, or legs? No   3. Do you have chest pain with activity? No   4. Do you have a history of  heart failure? No   5. Do you currently have a cold, bronchitis or symptoms of other infection? No   6. Do you have a cough, shortness of breath, or wheezing? YES - wheezing, chronic and stable   7. Do you or anyone in your family have previous history of blood clots? No   8. Do you or does anyone in your family have a serious bleeding problem such as prolonged bleeding following surgeries or cuts? No   9. Have you ever had problems with anemia or been told to take iron pills? No   10. Have you had any abnormal blood loss such as black, tarry or bloody stools? No   11. Have you ever had a blood transfusion? No   12. Are you willing to have a blood transfusion if it is medically needed before, during, or after your surgery? Yes   13. Have you or any of your relatives ever had problems with anesthesia? No   14. Do you have sleep apnea, excessive snoring or daytime drowsiness? YES - daytime drowsiness   14a. Do you have a CPAP machine? No   15. Do you have any artifical heart valves or other implanted medical devices like a pacemaker, defibrillator, or continuous glucose monitor? No   16. Do you have artificial joints? No   17. Are you allergic to latex? No     Health Care Directive:  Patient does not have a Health Care Directive or Living Will: Discussed advance care planning with patient; however, patient declined at this time.    Preoperative Review of :   reviewed - no record of controlled substances prescribed.      Status of Chronic Conditions:  ASTHMA - Patient has a longstanding history of mild Asthma . Patient has been doing well overall noting NO SYMPTOMS and continues on medication regimen consisting of rescue inhaler as needed without adverse reactions or side effects.     DEPRESSION - Patient has a long history of Depression of mild  severity requiring medication for control with recent symptoms being stable..Current symptoms of depression include none.     HYPERLIPIDEMIA - Patient has a long history of Hyperlipidemia       Review of Systems  Constitutional, neuro, ENT, endocrine, pulmonary, cardiac, gastrointestinal, genitourinary, musculoskeletal, integument and psychiatric systems are negative, except as otherwise noted.    Patient Active Problem List    Diagnosis Date Noted     Morbid obesity (H) 02/02/2018     Priority: Medium     Closed fracture of one rib of right side with routine healing 11/27/2017     Priority: Medium     Lumbar radicular pain 06/01/2016     Priority: Medium     Acquired spondylolisthesis of lumbosacral region 04/15/2016     Priority: Medium     Lumbosacral radiculopathy at L3 04/15/2016     Priority: Medium     Episodic mood disorder (H) 04/01/2014     Priority: Medium     Hyperlipidemia with target LDL less than 100 04/01/2014     Priority: Medium     History of acute pancreatitis      Priority: Medium     hospitalized 2013       Asthma 02/21/2011     Priority: Medium      Past Medical History:   Diagnosis Date     Asthma,unspecified type, unspecified 02/21/2011     History of acute pancreatitis     hospitalized 2013     Hyperlipidemia LDL goal < 100 4/1/2014     Unspecified episodic mood disorder 4/1/2014     Past Surgical History:   Procedure Laterality Date     APPENDECTOMY  1995     LAPAROSCOPIC FUSION LUMBAR POSTERIOR TWO LEVELS  6/1/16    L 3-L4 fusion      LASIK  2010    Bilateral     OPTICAL TRACKING SYSTEM FUSION POSTERIOR SPINE LUMBAR Right 6/1/2016    Procedure: OPTICAL TRACKING SYSTEM FUSION SPINE POSTERIOR LUMBAR ONE LEVEL;  Surgeon: Devon Summers MD;  Location: [facility] OR     VASECTOMY Bilateral 3/20/2018    Procedure: VASECTOMY;  Vasectomy;  Surgeon: Kale Story MD;  Location:  OR     Current Outpatient Medications   Medication Sig Dispense Refill     albuterol (PROAIR HFA/PROVENTIL HFA/VENTOLIN  "HFA) 108 (90 Base) MCG/ACT inhaler Inhale 2 puffs into the lungs every 6 hours as needed for shortness of breath / dyspnea 1 Inhaler 1     cyclobenzaprine (FLEXERIL) 10 MG tablet Take 1 tablet (10 mg) by mouth 3 times daily as needed for muscle spasms 120 tablet 0     EPINEPHrine (EPIPEN 2-MIKE) 0.3 MG/0.3ML injection Inject 0.3 mLs (0.3 mg) into the muscle once as needed for anaphylaxis 1 each 2     HYDROcodone-acetaminophen (NORCO) 5-325 MG per tablet Take 1-2 tablets by mouth every 4 hours as needed Take 1-2 tablets by mouth every 4-6 hours as needed for pain 12 tablet 0     ibuprofen 200 MG capsule Take with food or milk.    2 capsules as needed.       mupirocin (BACTROBAN) 2 % external ointment Apply topically 2 times daily 30 g 0     naproxen (NAPROSYN) 500 MG tablet Take 500 mg by mouth 4 times daily as needed          Allergies   Allergen Reactions     Penicillins      Childhood unknown     Bees Swelling        Social History     Tobacco Use     Smoking status: Never Smoker     Smokeless tobacco: Never Used   Substance Use Topics     Alcohol use: Yes     Comment: OCCASIONAL     Family History   Problem Relation Age of Onset     Other - See Comments Father         aortiv anyerism     C.A.D. Father      Heart Disease Father      Lipids Father      Other - See Comments Maternal Grandmother 89        blood clot     History   Drug Use No         Objective     /78 (BP Location: Right arm, Patient Position: Sitting, Cuff Size: Adult Large)   Pulse 80   Temp (!) 96.7  F (35.9  C) (Tympanic)   Resp 18   Ht 1.702 m (5' 7\")   Wt 127 kg (280 lb)   SpO2 97%   BMI 43.85 kg/m      Physical Exam    GENERAL APPEARANCE: healthy, alert and no distress     EYES: EOMI,  PERRL     HENT: ear canals and TM's normal and nose and mouth without ulcers or lesions     NECK: no adenopathy     RESP: lungs clear to auscultation - no rales, rhonchi or wheezes     CV: regular rates and rhythm and no murmur, click or rub     SKIN: " no suspicious lesions or rashes     NEURO: Normal strength and tone, sensory exam grossly normal, mentation intact and speech normal     PSYCH: mentation appears normal. and affect normal/bright    Recent Labs   Lab Test 11/11/20  1231   HGB 14.2           Diagnostics:  Recent Results (from the past 48 hour(s))   CBC with platelets    Collection Time: 09/08/22 10:00 AM   Result Value Ref Range    WBC Count 9.3 4.0 - 11.0 10e3/uL    RBC Count 4.61 4.40 - 5.90 10e6/uL    Hemoglobin 14.5 13.3 - 17.7 g/dL    Hematocrit 42.3 40.0 - 53.0 %    MCV 92 78 - 100 fL    MCH 31.5 26.5 - 33.0 pg    MCHC 34.3 31.5 - 36.5 g/dL    RDW 14.0 10.0 - 15.0 %    Platelet Count 288 150 - 450 10e3/uL   Basic metabolic panel    Collection Time: 09/08/22 10:00 AM   Result Value Ref Range    Sodium 136 133 - 144 mmol/L    Potassium 4.1 3.4 - 5.3 mmol/L    Chloride 105 94 - 109 mmol/L    Carbon Dioxide (CO2) 25 20 - 32 mmol/L    Anion Gap 6 3 - 14 mmol/L    Urea Nitrogen 23 7 - 30 mg/dL    Creatinine 1.03 0.66 - 1.25 mg/dL    Calcium 9.1 8.5 - 10.1 mg/dL    Glucose 95 70 - 99 mg/dL    GFR Estimate >90 >60 mL/min/1.73m2      EKG: mild bradycardia, normal axis, normal intervals, no acute ST/T changes c/w ischemia, no LVH by voltage criteria, unchanged from previous tracings    Revised Cardiac Risk Index (RCRI):  The patient has the following serious cardiovascular risks for perioperative complications:   - No serious cardiac risks = 0 points     RCRI Interpretation: 0 points: Class I (very low risk - 0.4% complication rate)           Signed Electronically by: Gabriela Morales MD  Copy of this evaluation report is provided to requesting physician.

## 2022-09-08 ENCOUNTER — OFFICE VISIT (OUTPATIENT)
Dept: FAMILY MEDICINE | Facility: OTHER | Age: 42
End: 2022-09-08
Attending: FAMILY MEDICINE
Payer: COMMERCIAL

## 2022-09-08 VITALS
RESPIRATION RATE: 18 BRPM | HEART RATE: 80 BPM | SYSTOLIC BLOOD PRESSURE: 134 MMHG | TEMPERATURE: 96.7 F | OXYGEN SATURATION: 97 % | DIASTOLIC BLOOD PRESSURE: 78 MMHG | BODY MASS INDEX: 43.95 KG/M2 | WEIGHT: 280 LBS | HEIGHT: 67 IN

## 2022-09-08 DIAGNOSIS — F39 EPISODIC MOOD DISORDER (H): ICD-10-CM

## 2022-09-08 DIAGNOSIS — E78.5 HYPERLIPIDEMIA WITH TARGET LDL LESS THAN 100: ICD-10-CM

## 2022-09-08 DIAGNOSIS — J45.20 MILD INTERMITTENT ASTHMA WITHOUT COMPLICATION: ICD-10-CM

## 2022-09-08 DIAGNOSIS — M77.50 BONE SPUR OF FOOT: ICD-10-CM

## 2022-09-08 DIAGNOSIS — E66.01 MORBID OBESITY (H): ICD-10-CM

## 2022-09-08 DIAGNOSIS — Z01.818 PRE-OPERATIVE GENERAL PHYSICAL EXAMINATION: Primary | ICD-10-CM

## 2022-09-08 LAB
ANION GAP SERPL CALCULATED.3IONS-SCNC: 6 MMOL/L (ref 3–14)
BUN SERPL-MCNC: 23 MG/DL (ref 7–30)
CALCIUM SERPL-MCNC: 9.1 MG/DL (ref 8.5–10.1)
CHLORIDE BLD-SCNC: 105 MMOL/L (ref 94–109)
CO2 SERPL-SCNC: 25 MMOL/L (ref 20–32)
CREAT SERPL-MCNC: 1.03 MG/DL (ref 0.66–1.25)
ERYTHROCYTE [DISTWIDTH] IN BLOOD BY AUTOMATED COUNT: 14 % (ref 10–15)
GFR SERPL CREATININE-BSD FRML MDRD: >90 ML/MIN/1.73M2
GLUCOSE BLD-MCNC: 95 MG/DL (ref 70–99)
HCT VFR BLD AUTO: 42.3 % (ref 40–53)
HGB BLD-MCNC: 14.5 G/DL (ref 13.3–17.7)
MCH RBC QN AUTO: 31.5 PG (ref 26.5–33)
MCHC RBC AUTO-ENTMCNC: 34.3 G/DL (ref 31.5–36.5)
MCV RBC AUTO: 92 FL (ref 78–100)
PLATELET # BLD AUTO: 288 10E3/UL (ref 150–450)
POTASSIUM BLD-SCNC: 4.1 MMOL/L (ref 3.4–5.3)
RBC # BLD AUTO: 4.61 10E6/UL (ref 4.4–5.9)
SODIUM SERPL-SCNC: 136 MMOL/L (ref 133–144)
WBC # BLD AUTO: 9.3 10E3/UL (ref 4–11)

## 2022-09-08 PROCEDURE — 80048 BASIC METABOLIC PNL TOTAL CA: CPT | Performed by: FAMILY MEDICINE

## 2022-09-08 PROCEDURE — 36415 COLL VENOUS BLD VENIPUNCTURE: CPT | Performed by: FAMILY MEDICINE

## 2022-09-08 PROCEDURE — 93000 ELECTROCARDIOGRAM COMPLETE: CPT | Mod: 77 | Performed by: INTERNAL MEDICINE

## 2022-09-08 PROCEDURE — 85027 COMPLETE CBC AUTOMATED: CPT | Performed by: FAMILY MEDICINE

## 2022-09-08 PROCEDURE — 99214 OFFICE O/P EST MOD 30 MIN: CPT | Performed by: FAMILY MEDICINE

## 2022-09-08 ASSESSMENT — ASTHMA QUESTIONNAIRES
QUESTION_3 LAST FOUR WEEKS HOW OFTEN DID YOUR ASTHMA SYMPTOMS (WHEEZING, COUGHING, SHORTNESS OF BREATH, CHEST TIGHTNESS OR PAIN) WAKE YOU UP AT NIGHT OR EARLIER THAN USUAL IN THE MORNING: NOT AT ALL
QUESTION_1 LAST FOUR WEEKS HOW MUCH OF THE TIME DID YOUR ASTHMA KEEP YOU FROM GETTING AS MUCH DONE AT WORK, SCHOOL OR AT HOME: A LITTLE OF THE TIME
QUESTION_5 LAST FOUR WEEKS HOW WOULD YOU RATE YOUR ASTHMA CONTROL: SOMEWHAT CONTROLLED
ACT_TOTALSCORE: 20
QUESTION_2 LAST FOUR WEEKS HOW OFTEN HAVE YOU HAD SHORTNESS OF BREATH: ONCE OR TWICE A WEEK
QUESTION_4 LAST FOUR WEEKS HOW OFTEN HAVE YOU USED YOUR RESCUE INHALER OR NEBULIZER MEDICATION (SUCH AS ALBUTEROL): ONCE A WEEK OR LESS
ACT_TOTALSCORE: 20

## 2022-09-08 ASSESSMENT — ANXIETY QUESTIONNAIRES
1. FEELING NERVOUS, ANXIOUS, OR ON EDGE: NOT AT ALL
GAD7 TOTAL SCORE: 0
7. FEELING AFRAID AS IF SOMETHING AWFUL MIGHT HAPPEN: NOT AT ALL
6. BECOMING EASILY ANNOYED OR IRRITABLE: NOT AT ALL
2. NOT BEING ABLE TO STOP OR CONTROL WORRYING: NOT AT ALL
3. WORRYING TOO MUCH ABOUT DIFFERENT THINGS: NOT AT ALL
4. TROUBLE RELAXING: NOT AT ALL
IF YOU CHECKED OFF ANY PROBLEMS ON THIS QUESTIONNAIRE, HOW DIFFICULT HAVE THESE PROBLEMS MADE IT FOR YOU TO DO YOUR WORK, TAKE CARE OF THINGS AT HOME, OR GET ALONG WITH OTHER PEOPLE: NOT DIFFICULT AT ALL
GAD7 TOTAL SCORE: 0
5. BEING SO RESTLESS THAT IT IS HARD TO SIT STILL: NOT AT ALL

## 2022-09-08 ASSESSMENT — PATIENT HEALTH QUESTIONNAIRE - PHQ9: SUM OF ALL RESPONSES TO PHQ QUESTIONS 1-9: 0

## 2022-09-08 ASSESSMENT — PAIN SCALES - GENERAL: PAINLEVEL: NO PAIN (0)

## 2022-09-08 NOTE — NURSING NOTE
"Chief Complaint   Patient presents with     Pre-Op Exam       Initial /78 (BP Location: Right arm, Patient Position: Sitting, Cuff Size: Adult Large)   Pulse 80   Temp (!) 96.7  F (35.9  C) (Tympanic)   Resp 18   Ht 1.702 m (5' 7\")   Wt 127 kg (280 lb)   SpO2 97%   BMI 43.85 kg/m   Estimated body mass index is 43.85 kg/m  as calculated from the following:    Height as of this encounter: 1.702 m (5' 7\").    Weight as of this encounter: 127 kg (280 lb).  Medication Reconciliation: complete  Heena Robb MA  "

## 2022-09-09 ENCOUNTER — TELEPHONE (OUTPATIENT)
Dept: FAMILY MEDICINE | Facility: OTHER | Age: 42
End: 2022-09-09

## 2022-09-09 NOTE — TELEPHONE ENCOUNTER
Pre op, EKG faxed to [facility] [phone] and to [phone] per [facility]

## 2022-09-12 ENCOUNTER — TRANSFERRED RECORDS (OUTPATIENT)
Dept: HEALTH INFORMATION MANAGEMENT | Facility: CLINIC | Age: 42
End: 2022-09-12

## 2022-09-26 ENCOUNTER — TRANSFERRED RECORDS (OUTPATIENT)
Dept: HEALTH INFORMATION MANAGEMENT | Facility: CLINIC | Age: 42
End: 2022-09-26

## 2022-10-11 ENCOUNTER — HOSPITAL ENCOUNTER (OUTPATIENT)
Dept: RESPIRATORY THERAPY | Facility: HOSPITAL | Age: 42
Discharge: HOME | End: 2022-10-11
Attending: FAMILY MEDICINE | Admitting: FAMILY MEDICINE
Payer: COMMERCIAL

## 2022-10-11 DIAGNOSIS — J45.20 MILD INTERMITTENT ASTHMA WITHOUT COMPLICATION: ICD-10-CM

## 2022-10-11 LAB
HGB BLD-MCNC: 14.4 G/DL (ref 13.3–17.7)
HOLD SPECIMEN: NORMAL

## 2022-10-11 PROCEDURE — 36415 COLL VENOUS BLD VENIPUNCTURE: CPT | Performed by: FAMILY MEDICINE

## 2022-10-11 PROCEDURE — 85018 HEMOGLOBIN: CPT | Performed by: FAMILY MEDICINE

## 2022-10-11 PROCEDURE — 94726 PLETHYSMOGRAPHY LUNG VOLUMES: CPT

## 2022-10-11 PROCEDURE — 94010 BREATHING CAPACITY TEST: CPT

## 2022-10-11 PROCEDURE — 94729 DIFFUSING CAPACITY: CPT

## 2022-10-11 PROCEDURE — 94010 BREATHING CAPACITY TEST: CPT | Mod: 26 | Performed by: INTERNAL MEDICINE

## 2022-10-24 ENCOUNTER — TRANSFERRED RECORDS (OUTPATIENT)
Dept: HEALTH INFORMATION MANAGEMENT | Facility: CLINIC | Age: 42
End: 2022-10-24

## 2023-10-12 ENCOUNTER — TELEPHONE (OUTPATIENT)
Dept: FAMILY MEDICINE | Facility: OTHER | Age: 43
End: 2023-10-12

## 2023-10-12 DIAGNOSIS — Z98.52 S/P VASECTOMY: Primary | ICD-10-CM

## 2023-10-12 NOTE — TELEPHONE ENCOUNTER
4:28 PM    Reason for Call: Phone Call    Description: Patient called in returning Heena's phone call. Please call patient back.     Was an appointment offered for this call? No  If yes : Appointment type              Date    Preferred method for responding to this message: Telephone Call  What is your phone number ? [phone]     If we cannot reach you directly, may we leave a detailed response at the number you provided? Yes    Can this message wait until your PCP/provider returns, if available today? Not applicable    Janet Briggs

## 2023-10-26 NOTE — PROGRESS NOTES
"SUBJECTIVE:   CC: Kale is an 43 year old who presents for preventative health visit.       Healthy Habits:     Getting at least 3 servings of Calcium per day:  Yes    Bi-annual eye exam:  NO    Dental care twice a year:  Yes    Sleep apnea or symptoms of sleep apnea:  Daytime drowsiness and Excessive snoring    Diet:  Regular (no restrictions)    Frequency of exercise:  None    Taking medications regularly:  Yes    Medication side effects:  None    Additional concerns today:  No      Hyperlipidemia Follow-Up    Are you regularly taking any medication or supplement to lower your cholesterol?   No  Are you having muscle aches or other side effects that you think could be caused by your cholesterol lowering medication?  No      Social History     Tobacco Use    Smoking status: Never    Smokeless tobacco: Never   Substance Use Topics    Alcohol use: Yes     Comment: OCCASIONAL             10/27/2023    11:04 AM   Alcohol Use   Prescreen: >3 drinks/day or >7 drinks/week? Yes   AUDIT SCORE  7       Last PSA: No results found for: \"PSA\"    Reviewed orders with patient. Reviewed health maintenance and updated orders accordingly - Yes  Patient Active Problem List   Diagnosis    Asthma    History of acute pancreatitis    Episodic mood disorder (H24)    Hyperlipidemia with target LDL less than 100    Acquired spondylolisthesis of lumbosacral region    Lumbosacral radiculopathy at L3    Lumbar radicular pain    Morbid obesity (H)    Closed fracture of one rib of right side with routine healing     Past Surgical History:   Procedure Laterality Date    APPENDECTOMY  1995    LAPAROSCOPIC FUSION LUMBAR POSTERIOR TWO LEVELS  6/1/16    L 3-L4 fusion     LASIK  2010    Bilateral    OPTICAL TRACKING SYSTEM FUSION POSTERIOR SPINE LUMBAR Right 6/1/2016    Procedure: OPTICAL TRACKING SYSTEM FUSION SPINE POSTERIOR LUMBAR ONE LEVEL;  Surgeon: Devon Summers MD;  Location: [facility] OR    VASECTOMY Bilateral 3/20/2018    Procedure: VASECTOMY; "  Vasectomy;  Surgeon: Kale Story MD;  Location:  OR       Social History     Tobacco Use    Smoking status: Never    Smokeless tobacco: Never   Substance Use Topics    Alcohol use: Yes     Comment: OCCASIONAL     Family History   Problem Relation Age of Onset    Other - See Comments Father         aortiv anyerism    C.A.D. Father     Heart Disease Father     Lipids Father     Other - See Comments Maternal Grandmother 89        blood clot         Current Outpatient Medications   Medication Sig Dispense Refill    albuterol (PROAIR HFA/PROVENTIL HFA/VENTOLIN HFA) 108 (90 Base) MCG/ACT inhaler Inhale 2 puffs into the lungs every 6 hours as needed for shortness of breath / dyspnea 1 Inhaler 1    cyclobenzaprine (FLEXERIL) 10 MG tablet Take 1 tablet (10 mg) by mouth 3 times daily as needed for muscle spasms 120 tablet 0    EPINEPHrine (EPIPEN 2-MIKE) 0.3 MG/0.3ML injection Inject 0.3 mLs (0.3 mg) into the muscle once as needed for anaphylaxis 1 each 2    HYDROcodone-acetaminophen (NORCO) 5-325 MG per tablet Take 1-2 tablets by mouth every 4 hours as needed Take 1-2 tablets by mouth every 4-6 hours as needed for pain 12 tablet 0    ibuprofen 200 MG capsule Take with food or milk.    2 capsules as needed.      mupirocin (BACTROBAN) 2 % external ointment Apply topically 2 times daily 30 g 0    naproxen (NAPROSYN) 500 MG tablet Take 500 mg by mouth 4 times daily as needed        Allergies   Allergen Reactions    Penicillins      Childhood unknown    Bees Swelling       Reviewed and updated as needed this visit by clinical staff   Tobacco  Allergies  Meds  Problems  Med Hx  Surg Hx  Fam Hx          Reviewed and updated as needed this visit by Provider   Tobacco  Allergies  Meds  Problems  Med Hx  Surg Hx  Fam Hx             Review of Systems   Constitutional:  Negative for chills and fever.   HENT:  Negative for congestion, ear pain, hearing loss and sore throat.    Eyes:  Negative for pain and visual  "disturbance.   Respiratory:  Negative for cough and shortness of breath.    Cardiovascular:  Negative for chest pain, palpitations and peripheral edema.   Gastrointestinal:  Negative for abdominal pain, constipation, diarrhea, heartburn, hematochezia and nausea.   Genitourinary:  Negative for dysuria, frequency, genital sores, hematuria, impotence, penile discharge and urgency.   Musculoskeletal:  Negative for arthralgias, joint swelling and myalgias.   Skin:  Negative for rash.   Neurological:  Negative for dizziness, weakness, headaches and paresthesias.   Psychiatric/Behavioral:  Negative for mood changes. The patient is not nervous/anxious.        OBJECTIVE:   /84 (BP Location: Right arm, Patient Position: Sitting, Cuff Size: Adult Large)   Pulse 81   Temp 97.3  F (36.3  C) (Tympanic)   Resp 16   Ht 1.66 m (5' 5.35\")   Wt 126.8 kg (279 lb 8 oz)   SpO2 98%   BMI 46.01 kg/m      Physical Exam  GENERAL: healthy, alert and no distress  EYES: Eyes grossly normal to inspection, PERRL and conjunctivae and sclerae normal  HENT: ear canals and TM's normal, nose and mouth without ulcers or lesions  NECK: no adenopathy  RESP: lungs clear to auscultation - no rales, rhonchi or wheezes  CV: regular rates and rhythm, normal S1 S2, no S3 or S4, and no murmur, click or rub  ABDOMEN: soft, nontender, no hepatosplenomegaly, no masses and bowel sounds normal  MS: no gross musculoskeletal defects noted, no edema  SKIN: no suspicious lesions or rashes  NEURO: Normal strength and tone, mentation intact and speech normal  PSYCH: mentation appears normal, affect normal/bright    Diagnostic Test Results:  See orders    ASSESSMENT/PLAN:       ICD-10-CM    1. Routine general medical examination at a health care facility  Z00.00 Glucose     Glucose      2. Hyperlipidemia with target LDL less than 100  E78.5 Lipid Profile     Lipid Profile      3. Screen for colon cancer  Z12.11 SYLVIE([employer])    "           COUNSELING:   Reviewed preventive health counseling, as reflected in patient instructions       Colorectal cancer screening        He reports that he has never smoked. He has never used smokeless tobacco.            Gabriela Morales MD  [facility]    Answers submitted by the patient for this visit:  Patient Health Questionnaire (Submitted on 10/27/2023)  If you checked off any problems, how difficult have these problems made it for you to do your work, take care of things at home, or get along with other people?: Not difficult at all  PHQ9 TOTAL SCORE: 4  YANELY-7 (Submitted on 10/27/2023)  YANELY 7 TOTAL SCORE: 3

## 2023-10-27 ENCOUNTER — LAB (OUTPATIENT)
Dept: FAMILY MEDICINE | Facility: OTHER | Age: 43
End: 2023-10-27

## 2023-10-27 ENCOUNTER — OFFICE VISIT (OUTPATIENT)
Dept: FAMILY MEDICINE | Facility: OTHER | Age: 43
End: 2023-10-27
Attending: FAMILY MEDICINE
Payer: COMMERCIAL

## 2023-10-27 VITALS
TEMPERATURE: 97.3 F | WEIGHT: 279.5 LBS | BODY MASS INDEX: 46.57 KG/M2 | OXYGEN SATURATION: 98 % | HEIGHT: 65 IN | SYSTOLIC BLOOD PRESSURE: 134 MMHG | DIASTOLIC BLOOD PRESSURE: 84 MMHG | RESPIRATION RATE: 16 BRPM | HEART RATE: 81 BPM

## 2023-10-27 DIAGNOSIS — Z12.11 SCREEN FOR COLON CANCER: ICD-10-CM

## 2023-10-27 DIAGNOSIS — E78.5 HYPERLIPIDEMIA WITH TARGET LDL LESS THAN 100: ICD-10-CM

## 2023-10-27 DIAGNOSIS — Z00.00 ROUTINE GENERAL MEDICAL EXAMINATION AT A HEALTH CARE FACILITY: Primary | ICD-10-CM

## 2023-10-27 LAB
CHOLEST SERPL-MCNC: 280 MG/DL
FASTING STATUS PATIENT QL REPORTED: YES
GLUCOSE SERPL-MCNC: 97 MG/DL (ref 70–99)
HDLC SERPL-MCNC: 47 MG/DL
LDLC SERPL CALC-MCNC: 179 MG/DL
NONHDLC SERPL-MCNC: 233 MG/DL
TRIGL SERPL-MCNC: 271 MG/DL

## 2023-10-27 PROCEDURE — 99396 PREV VISIT EST AGE 40-64: CPT | Performed by: FAMILY MEDICINE

## 2023-10-27 PROCEDURE — 80061 LIPID PANEL: CPT | Performed by: FAMILY MEDICINE

## 2023-10-27 PROCEDURE — 82947 ASSAY GLUCOSE BLOOD QUANT: CPT | Performed by: FAMILY MEDICINE

## 2023-10-27 PROCEDURE — 36415 COLL VENOUS BLD VENIPUNCTURE: CPT | Performed by: FAMILY MEDICINE

## 2023-10-27 ASSESSMENT — ANXIETY QUESTIONNAIRES
3. WORRYING TOO MUCH ABOUT DIFFERENT THINGS: SEVERAL DAYS
GAD7 TOTAL SCORE: 3
7. FEELING AFRAID AS IF SOMETHING AWFUL MIGHT HAPPEN: NOT AT ALL
6. BECOMING EASILY ANNOYED OR IRRITABLE: NOT AT ALL
7. FEELING AFRAID AS IF SOMETHING AWFUL MIGHT HAPPEN: NOT AT ALL
8. IF YOU CHECKED OFF ANY PROBLEMS, HOW DIFFICULT HAVE THESE MADE IT FOR YOU TO DO YOUR WORK, TAKE CARE OF THINGS AT HOME, OR GET ALONG WITH OTHER PEOPLE?: NOT DIFFICULT AT ALL
1. FEELING NERVOUS, ANXIOUS, OR ON EDGE: SEVERAL DAYS
5. BEING SO RESTLESS THAT IT IS HARD TO SIT STILL: NOT AT ALL
4. TROUBLE RELAXING: NOT AT ALL
2. NOT BEING ABLE TO STOP OR CONTROL WORRYING: SEVERAL DAYS
IF YOU CHECKED OFF ANY PROBLEMS ON THIS QUESTIONNAIRE, HOW DIFFICULT HAVE THESE PROBLEMS MADE IT FOR YOU TO DO YOUR WORK, TAKE CARE OF THINGS AT HOME, OR GET ALONG WITH OTHER PEOPLE: NOT DIFFICULT AT ALL
GAD7 TOTAL SCORE: 3
GAD7 TOTAL SCORE: 3

## 2023-10-27 ASSESSMENT — ENCOUNTER SYMPTOMS
HEARTBURN: 0
NERVOUS/ANXIOUS: 0
DIZZINESS: 0
HEMATOCHEZIA: 0
EYE PAIN: 0
FREQUENCY: 0
PARESTHESIAS: 0
MYALGIAS: 0
HEADACHES: 0
CHILLS: 0
ARTHRALGIAS: 0
WEAKNESS: 0
PALPITATIONS: 0
JOINT SWELLING: 0
SHORTNESS OF BREATH: 0
DYSURIA: 0
CONSTIPATION: 0
SORE THROAT: 0
NAUSEA: 0
ABDOMINAL PAIN: 0
COUGH: 0
DIARRHEA: 0
HEMATURIA: 0
FEVER: 0

## 2023-10-27 ASSESSMENT — ASTHMA QUESTIONNAIRES
QUESTION_2 LAST FOUR WEEKS HOW OFTEN HAVE YOU HAD SHORTNESS OF BREATH: NOT AT ALL
ACT_TOTALSCORE: 24
QUESTION_3 LAST FOUR WEEKS HOW OFTEN DID YOUR ASTHMA SYMPTOMS (WHEEZING, COUGHING, SHORTNESS OF BREATH, CHEST TIGHTNESS OR PAIN) WAKE YOU UP AT NIGHT OR EARLIER THAN USUAL IN THE MORNING: NOT AT ALL
ACT_TOTALSCORE: 24
QUESTION_1 LAST FOUR WEEKS HOW MUCH OF THE TIME DID YOUR ASTHMA KEEP YOU FROM GETTING AS MUCH DONE AT WORK, SCHOOL OR AT HOME: NONE OF THE TIME
QUESTION_5 LAST FOUR WEEKS HOW WOULD YOU RATE YOUR ASTHMA CONTROL: WELL CONTROLLED
QUESTION_4 LAST FOUR WEEKS HOW OFTEN HAVE YOU USED YOUR RESCUE INHALER OR NEBULIZER MEDICATION (SUCH AS ALBUTEROL): NOT AT ALL

## 2023-10-27 ASSESSMENT — PATIENT HEALTH QUESTIONNAIRE - PHQ9
SUM OF ALL RESPONSES TO PHQ QUESTIONS 1-9: 4
SUM OF ALL RESPONSES TO PHQ QUESTIONS 1-9: 4
10. IF YOU CHECKED OFF ANY PROBLEMS, HOW DIFFICULT HAVE THESE PROBLEMS MADE IT FOR YOU TO DO YOUR WORK, TAKE CARE OF THINGS AT HOME, OR GET ALONG WITH OTHER PEOPLE: NOT DIFFICULT AT ALL

## 2023-10-27 ASSESSMENT — PAIN SCALES - GENERAL: PAINLEVEL: NO PAIN (0)

## 2023-11-29 LAB — NONINV COLON CA DNA+OCC BLD SCRN STL QL: NEGATIVE

## 2024-09-12 ENCOUNTER — TELEPHONE (OUTPATIENT)
Dept: FAMILY MEDICINE | Facility: OTHER | Age: 44
End: 2024-09-12

## 2024-09-12 NOTE — TELEPHONE ENCOUNTER
8:15 AM    Reason for Call: OVERBOOK    Patient is having the following symptoms: PHYSICAL - NEEDS TO BE DONE BY 9-30-24 FOR [employer]    The patient is requesting an appointment for ASAP with DR OLIVERA.    Was an appointment offered for this call? No  If yes : Appointment type              Date    Preferred method for responding to this message: Telephone Call  What is your phone number ?[phone]     If we cannot reach you directly, may we leave a detailed response at the number you provided? Yes    Can this message wait until your PCP/provider returns, if unavailable today? Not applicable, PROVIDER IN CLINIC    Janet Briggs

## 2024-09-23 ENCOUNTER — OFFICE VISIT (OUTPATIENT)
Dept: FAMILY MEDICINE | Facility: OTHER | Age: 44
End: 2024-09-23
Attending: FAMILY MEDICINE
Payer: COMMERCIAL

## 2024-09-23 VITALS
HEIGHT: 67 IN | TEMPERATURE: 97.6 F | WEIGHT: 284.4 LBS | RESPIRATION RATE: 16 BRPM | HEART RATE: 89 BPM | SYSTOLIC BLOOD PRESSURE: 124 MMHG | DIASTOLIC BLOOD PRESSURE: 80 MMHG | BODY MASS INDEX: 44.64 KG/M2 | OXYGEN SATURATION: 95 %

## 2024-09-23 DIAGNOSIS — J45.20 MILD INTERMITTENT ASTHMA WITHOUT COMPLICATION: ICD-10-CM

## 2024-09-23 DIAGNOSIS — E78.5 HYPERLIPIDEMIA WITH TARGET LDL LESS THAN 100: ICD-10-CM

## 2024-09-23 DIAGNOSIS — F39 EPISODIC MOOD DISORDER (H): ICD-10-CM

## 2024-09-23 DIAGNOSIS — Z00.00 ROUTINE GENERAL MEDICAL EXAMINATION AT A HEALTH CARE FACILITY: Primary | ICD-10-CM

## 2024-09-23 PROCEDURE — 99396 PREV VISIT EST AGE 40-64: CPT | Performed by: FAMILY MEDICINE

## 2024-09-23 PROCEDURE — 99212 OFFICE O/P EST SF 10 MIN: CPT | Mod: 25 | Performed by: FAMILY MEDICINE

## 2024-09-23 SDOH — HEALTH STABILITY: PHYSICAL HEALTH
ON AVERAGE, HOW MANY DAYS PER WEEK DO YOU ENGAGE IN MODERATE TO STRENUOUS EXERCISE (LIKE A BRISK WALK)?: PATIENT DECLINED

## 2024-09-23 ASSESSMENT — ASTHMA QUESTIONNAIRES
QUESTION_1 LAST FOUR WEEKS HOW MUCH OF THE TIME DID YOUR ASTHMA KEEP YOU FROM GETTING AS MUCH DONE AT WORK, SCHOOL OR AT HOME: A LITTLE OF THE TIME
ACT_TOTALSCORE: 22
QUESTION_5 LAST FOUR WEEKS HOW WOULD YOU RATE YOUR ASTHMA CONTROL: WELL CONTROLLED
ACT_TOTALSCORE: 22
QUESTION_4 LAST FOUR WEEKS HOW OFTEN HAVE YOU USED YOUR RESCUE INHALER OR NEBULIZER MEDICATION (SUCH AS ALBUTEROL): NOT AT ALL
QUESTION_2 LAST FOUR WEEKS HOW OFTEN HAVE YOU HAD SHORTNESS OF BREATH: ONCE OR TWICE A WEEK
QUESTION_3 LAST FOUR WEEKS HOW OFTEN DID YOUR ASTHMA SYMPTOMS (WHEEZING, COUGHING, SHORTNESS OF BREATH, CHEST TIGHTNESS OR PAIN) WAKE YOU UP AT NIGHT OR EARLIER THAN USUAL IN THE MORNING: NOT AT ALL

## 2024-09-23 ASSESSMENT — PAIN SCALES - GENERAL: PAINLEVEL: NO PAIN (0)

## 2024-10-18 ENCOUNTER — LAB (OUTPATIENT)
Dept: LAB | Facility: OTHER | Age: 44
End: 2024-10-18
Payer: COMMERCIAL

## 2024-10-18 DIAGNOSIS — Z00.00 ROUTINE GENERAL MEDICAL EXAMINATION AT A HEALTH CARE FACILITY: ICD-10-CM

## 2024-10-18 DIAGNOSIS — E78.5 HYPERLIPIDEMIA WITH TARGET LDL LESS THAN 100: ICD-10-CM

## 2024-10-18 LAB
CHOLEST SERPL-MCNC: 278 MG/DL
FASTING STATUS PATIENT QL REPORTED: YES
FASTING STATUS PATIENT QL REPORTED: YES
GLUCOSE SERPL-MCNC: 102 MG/DL (ref 70–99)
HDLC SERPL-MCNC: 49 MG/DL
HOLD SPECIMEN: NORMAL
LDLC SERPL CALC-MCNC: 185 MG/DL
NONHDLC SERPL-MCNC: 229 MG/DL
TRIGL SERPL-MCNC: 221 MG/DL

## 2024-10-18 PROCEDURE — 36415 COLL VENOUS BLD VENIPUNCTURE: CPT

## 2024-10-18 PROCEDURE — 82947 ASSAY GLUCOSE BLOOD QUANT: CPT

## 2024-10-18 PROCEDURE — 80061 LIPID PANEL: CPT

## 2025-02-13 ENCOUNTER — HOSPITAL ENCOUNTER (EMERGENCY)
Facility: HOSPITAL | Age: 45
Discharge: HOME | End: 2025-02-13
Attending: NURSE PRACTITIONER | Admitting: NURSE PRACTITIONER
Payer: COMMERCIAL

## 2025-02-13 ENCOUNTER — APPOINTMENT (OUTPATIENT)
Dept: GENERAL RADIOLOGY | Facility: HOSPITAL | Age: 45
End: 2025-02-13
Attending: NURSE PRACTITIONER
Payer: COMMERCIAL

## 2025-02-13 VITALS
RESPIRATION RATE: 16 BRPM | DIASTOLIC BLOOD PRESSURE: 96 MMHG | HEART RATE: 87 BPM | OXYGEN SATURATION: 96 % | TEMPERATURE: 97.5 F | SYSTOLIC BLOOD PRESSURE: 164 MMHG

## 2025-02-13 DIAGNOSIS — M25.512 LEFT SHOULDER PAIN: Primary | ICD-10-CM

## 2025-02-13 PROCEDURE — G0463 HOSPITAL OUTPT CLINIC VISIT: HCPCS

## 2025-02-13 PROCEDURE — 99213 OFFICE O/P EST LOW 20 MIN: CPT | Performed by: NURSE PRACTITIONER

## 2025-02-13 PROCEDURE — 73030 X-RAY EXAM OF SHOULDER: CPT | Mod: LT

## 2025-02-13 RX ORDER — PREDNISONE 10 MG/1
40 TABLET ORAL DAILY
Qty: 30 TABLET | Refills: 0 | Status: SHIPPED | OUTPATIENT
Start: 2025-02-13 | End: 2025-02-18

## 2025-02-13 ASSESSMENT — ENCOUNTER SYMPTOMS
CHILLS: 0
VOMITING: 0
NECK STIFFNESS: 0
DIARRHEA: 0
NECK PAIN: 0
FEVER: 0
PSYCHIATRIC NEGATIVE: 1
NAUSEA: 0
SHORTNESS OF BREATH: 0

## 2025-02-13 ASSESSMENT — COLUMBIA-SUICIDE SEVERITY RATING SCALE - C-SSRS
2. HAVE YOU ACTUALLY HAD ANY THOUGHTS OF KILLING YOURSELF IN THE PAST MONTH?: NO
6. HAVE YOU EVER DONE ANYTHING, STARTED TO DO ANYTHING, OR PREPARED TO DO ANYTHING TO END YOUR LIFE?: NO
1. IN THE PAST MONTH, HAVE YOU WISHED YOU WERE DEAD OR WISHED YOU COULD GO TO SLEEP AND NOT WAKE UP?: NO

## 2025-02-14 NOTE — DISCHARGE INSTRUCTIONS
Prednisone as ordered    Rest  Ice  Topical anesthetic such as Bengay, IcyHot, Biofreeze or Lidoderm patches as needed  Gentle stretching    Follow-up with primary care provider or return to urgent care/ED with any worsening in condition or additional concerns.

## 2025-02-14 NOTE — ED PROVIDER NOTES
History     Chief Complaint   Patient presents with    Shoulder Pain     HPI  Kale Rosario is a 44 year old male who presents to urgent care today (ambulatory) with complaints of left shoulder pain that started 2/3/2025 while patient was at work and lifted up a 30 lb battery charger and resulted in left shoulder pain.  Full ROM of left shoulder and neck.  Denies any fever, chills, nausea, vomiting, diarrhea, SOB or chest pain.  Has followed up with physical therapy and chiropractor.  Denies any previous fracture, dislocation or surgery to left upper extremity.  Has been taking cyclobenzaprine.  No other injuries.  Denies any previous imaging to left shoulder since injury.  No other concerns.    Allergies:  Allergies   Allergen Reactions    Penicillins      Childhood unknown    Bees Swelling       Problem List:    Patient Active Problem List    Diagnosis Date Noted    Morbid obesity (H) 02/02/2018     Priority: Medium    Closed fracture of one rib of right side with routine healing 11/27/2017     Priority: Medium    Lumbar radicular pain 06/01/2016     Priority: Medium    Acquired spondylolisthesis of lumbosacral region 04/15/2016     Priority: Medium    Lumbosacral radiculopathy at L3 04/15/2016     Priority: Medium    Episodic mood disorder 04/01/2014     Priority: Medium    Hyperlipidemia with target LDL less than 100 04/01/2014     Priority: Medium    History of acute pancreatitis      Priority: Medium     hospitalized 2013      Asthma 02/21/2011     Priority: Medium        Past Medical History:    Past Medical History:   Diagnosis Date    Asthma,unspecified type, unspecified 02/21/2011    History of acute pancreatitis     Hyperlipidemia LDL goal < 100 4/1/2014    Unspecified episodic mood disorder 4/1/2014       Past Surgical History:    Past Surgical History:   Procedure Laterality Date    APPENDECTOMY  1995    LAPAROSCOPIC FUSION LUMBAR POSTERIOR TWO LEVELS  6/1/16    L 3-L4 fusion     LASIK  2010     Bilateral    OPTICAL TRACKING SYSTEM FUSION POSTERIOR SPINE LUMBAR Right 6/1/2016    Procedure: OPTICAL TRACKING SYSTEM FUSION SPINE POSTERIOR LUMBAR ONE LEVEL;  Surgeon: Devon Summers MD;  Location: [facility] OR    VASECTOMY Bilateral 3/20/2018    Procedure: VASECTOMY;  Vasectomy;  Surgeon: Kale Story MD;  Location: [facility] OR       Family History:    Family History   Problem Relation Age of Onset    Other - See Comments Father         aortiv anyerism    C.A.D. Father     Heart Disease Father     Lipids Father     Other - See Comments Maternal Grandmother 89        blood clot       Social History:  Marital Status:   [2]  Social History     Tobacco Use    Smoking status: Never    Smokeless tobacco: Never   Substance Use Topics    Alcohol use: Yes     Comment: OCCASIONAL    Drug use: No        Medications:    cyclobenzaprine (FLEXERIL) 10 MG tablet  ibuprofen 200 MG capsule  albuterol (PROAIR HFA/PROVENTIL HFA/VENTOLIN HFA) 108 (90 Base) MCG/ACT inhaler  EPINEPHrine (EPIPEN 2-MIKE) 0.3 MG/0.3ML injection  HYDROcodone-acetaminophen (NORCO) 5-325 MG per tablet  mupirocin (BACTROBAN) 2 % external ointment  naproxen (NAPROSYN) 500 MG tablet  predniSONE (DELTASONE) 10 MG tablet      Review of Systems   Constitutional:  Negative for chills and fever.   Respiratory:  Negative for shortness of breath.    Cardiovascular:  Negative for chest pain.   Gastrointestinal:  Negative for diarrhea, nausea and vomiting.   Musculoskeletal:  Negative for gait problem, neck pain and neck stiffness.        Left shoulder pain   Skin: Negative.    Psychiatric/Behavioral: Negative.       Physical Exam   BP: (!) 164/96  Pulse: 87  Temp: 97.5  F (36.4  C)  Resp: 16  SpO2: 96 %    Physical Exam  Vitals and nursing note reviewed.   Constitutional:       General: He is not in acute distress.     Appearance: Normal appearance. He is not ill-appearing or toxic-appearing.   Cardiovascular:      Rate and Rhythm: Normal rate and regular rhythm.       Pulses: Normal pulses.      Heart sounds: Normal heart sounds.   Pulmonary:      Effort: Pulmonary effort is normal.      Breath sounds: Normal breath sounds.   Musculoskeletal:      Left shoulder: Tenderness present. No swelling, deformity, effusion, laceration or crepitus. Normal range of motion. Normal strength. Normal pulse.      Left upper arm: Normal.      Left elbow: Normal.      Left wrist: Normal.   Skin:     General: Skin is warm and dry.      Capillary Refill: Capillary refill takes less than 2 seconds.   Neurological:      Mental Status: He is alert.   Psychiatric:         Mood and Affect: Mood normal.       ED Course     Procedures    No results found for this or any previous visit (from the past 24 hours).    Medications - No data to display    Assessments & Plan (with Medical Decision Making)     I have reviewed the nursing notes.    I have reviewed the findings, diagnosis, plan and need for follow up with the patient.  (M25.512) Left shoulder pain  Plan:   Patient ambulatory with a nontoxic appearance.  Patient arrived with left shoulder pain which has been ongoing since a work injury that took place on 2/3/2025 when patient was lifting a 30 pound battery.  Did not feel a pop.  Full ROM of left upper extremity.  Full ROM of neck.  Has been following up with physical therapy and a chiropractor.  Has followed up with occupational medicine.  Left shoulder x-ray completed in urgent care tonight, imaging is backed up averaging a few hours at this time, patient does not want to wait for results, wants to be notified via telephone.  Will start patient on short course of prednisone.  Patient to continue cyclobenzaprine as needed.  Rest, ice and topical anesthetic such as Bengay, ACI, Biofreeze or Lidoderm patches as needed.  Gentle stretching.  Follow-up with primary care provider or return to urgent care/ED with any worsening in condition or additional concerns.  Patient in agreement treatment  plan.    New Prescriptions    PREDNISONE (DELTASONE) 10 MG TABLET    Take 4 tablets (40 mg) by mouth daily for 5 days.     Final diagnoses:   Left shoulder pain     2/13/2025   [facility]       Maricel Collins NP  02/13/25 1945

## 2025-02-14 NOTE — ED TRIAGE NOTES
Pt reports injuring his left shoulder at work February 3, and its not gotten any better with PT, dry needling, and chiropractic.

## 2025-08-25 ENCOUNTER — PATIENT OUTREACH (OUTPATIENT)
Dept: CARE COORDINATION | Facility: CLINIC | Age: 45
End: 2025-08-25
Payer: COMMERCIAL

## (undated) DEVICE — ESU PENCIL W/SMOKE EVAC CVPLP2000

## (undated) DEVICE — ESU NDL COLORADO MICRO E1651

## (undated) DEVICE — BLADE CLIPPER 4406

## (undated) DEVICE — NDL 25GA 1.5" 305127

## (undated) DEVICE — BLADE KNIFE SURG 15 371115

## (undated) DEVICE — DRAPE SHEET REV FOLD 3/4 9349

## (undated) DEVICE — SU CHROMIC 3-0 RB-1 27" U204H

## (undated) DEVICE — DRAPE MAYO STAND 23X54 8337

## (undated) DEVICE — SOL WATER 1500ML

## (undated) DEVICE — DRAPE TOWEL 17X27" BLUE LF DISP 28700-004

## (undated) DEVICE — PEN MARKING SKIN W/LABELS 31145918

## (undated) DEVICE — SYR 10ML LL W/O NDL

## (undated) DEVICE — Device

## (undated) DEVICE — SPONGE RAY-TEC 4X4" 7317

## (undated) DEVICE — SUPPORTER ATHLETIC LG LATEX 202636

## (undated) DEVICE — LIGHT HANDLES PLASTIC

## (undated) DEVICE — PREP SKIN SCRUB TRAY 4461A

## (undated) DEVICE — GLOVE PROTEXIS POWDER FREE SMT 8.5 2D72PT85X

## (undated) DEVICE — ESU GROUND PAD ADULT W/CORD E7507

## (undated) DEVICE — LABEL YELLOW TIME OUT CUSTOM SBA15TOFHB

## (undated) DEVICE — DRSG KERLIX SUPER SPONGE 7310

## (undated) RX ORDER — PROPOFOL 10 MG/ML
INJECTION, EMULSION INTRAVENOUS
Status: DISPENSED
Start: 2018-03-20

## (undated) RX ORDER — LIDOCAINE HYDROCHLORIDE 20 MG/ML
INJECTION, SOLUTION EPIDURAL; INFILTRATION; INTRACAUDAL; PERINEURAL
Status: DISPENSED
Start: 2018-03-20

## (undated) RX ORDER — BACITRACIN ZINC 500 [USP'U]/G
OINTMENT TOPICAL
Status: DISPENSED
Start: 2018-03-20

## (undated) RX ORDER — LIDOCAINE HYDROCHLORIDE 20 MG/ML
INJECTION, SOLUTION INFILTRATION; PERINEURAL
Status: DISPENSED
Start: 2018-03-20